# Patient Record
Sex: FEMALE | Race: WHITE | Employment: OTHER | URBAN - METROPOLITAN AREA
[De-identification: names, ages, dates, MRNs, and addresses within clinical notes are randomized per-mention and may not be internally consistent; named-entity substitution may affect disease eponyms.]

---

## 2017-06-02 ENCOUNTER — OFFICE VISIT (OUTPATIENT)
Dept: CARDIOLOGY CLINIC | Age: 82
End: 2017-06-02

## 2017-06-02 VITALS
HEIGHT: 62 IN | SYSTOLIC BLOOD PRESSURE: 134 MMHG | OXYGEN SATURATION: 94 % | DIASTOLIC BLOOD PRESSURE: 62 MMHG | WEIGHT: 210.8 LBS | BODY MASS INDEX: 38.79 KG/M2 | HEART RATE: 48 BPM

## 2017-06-02 DIAGNOSIS — I25.83 CORONARY ARTERY DISEASE DUE TO LIPID RICH PLAQUE: Primary | ICD-10-CM

## 2017-06-02 DIAGNOSIS — I10 ESSENTIAL HYPERTENSION: ICD-10-CM

## 2017-06-02 DIAGNOSIS — I25.10 CORONARY ARTERY DISEASE DUE TO LIPID RICH PLAQUE: Primary | ICD-10-CM

## 2017-06-02 DIAGNOSIS — I87.2 VENOUS INSUFFICIENCY: ICD-10-CM

## 2017-06-02 DIAGNOSIS — E78.00 PURE HYPERCHOLESTEROLEMIA: ICD-10-CM

## 2017-06-02 DIAGNOSIS — Z95.1 S/P CABG (CORONARY ARTERY BYPASS GRAFT): ICD-10-CM

## 2017-06-02 PROCEDURE — G8417 CALC BMI ABV UP PARAM F/U: HCPCS | Performed by: INTERNAL MEDICINE

## 2017-06-02 PROCEDURE — 1090F PRES/ABSN URINE INCON ASSESS: CPT | Performed by: INTERNAL MEDICINE

## 2017-06-02 PROCEDURE — 1123F ACP DISCUSS/DSCN MKR DOCD: CPT | Performed by: INTERNAL MEDICINE

## 2017-06-02 PROCEDURE — G8598 ASA/ANTIPLAT THER USED: HCPCS | Performed by: INTERNAL MEDICINE

## 2017-06-02 PROCEDURE — 4040F PNEUMOC VAC/ADMIN/RCVD: CPT | Performed by: INTERNAL MEDICINE

## 2017-06-02 PROCEDURE — 1036F TOBACCO NON-USER: CPT | Performed by: INTERNAL MEDICINE

## 2017-06-02 PROCEDURE — G8400 PT W/DXA NO RESULTS DOC: HCPCS | Performed by: INTERNAL MEDICINE

## 2017-06-02 PROCEDURE — G8427 DOCREV CUR MEDS BY ELIG CLIN: HCPCS | Performed by: INTERNAL MEDICINE

## 2017-06-02 PROCEDURE — 99214 OFFICE O/P EST MOD 30 MIN: CPT | Performed by: INTERNAL MEDICINE

## 2020-03-27 ENCOUNTER — APPOINTMENT (OUTPATIENT)
Dept: GENERAL RADIOLOGY | Age: 85
DRG: 872 | End: 2020-03-27
Payer: MEDICARE

## 2020-03-27 ENCOUNTER — HOSPITAL ENCOUNTER (INPATIENT)
Age: 85
LOS: 2 days | Discharge: HOME | DRG: 872 | End: 2020-03-29
Attending: EMERGENCY MEDICINE | Admitting: FAMILY MEDICINE
Payer: MEDICARE

## 2020-03-27 PROBLEM — N39.0 UTI (URINARY TRACT INFECTION): Status: ACTIVE | Noted: 2020-03-27

## 2020-03-27 LAB
ANION GAP SERPL CALCULATED.3IONS-SCNC: 14 MMOL/L (ref 3–16)
BACTERIA: ABNORMAL /HPF
BASOPHILS ABSOLUTE: 0 K/UL (ref 0–0.2)
BASOPHILS RELATIVE PERCENT: 0.3 %
BILIRUBIN URINE: NEGATIVE
BLOOD, URINE: ABNORMAL
BUN BLDV-MCNC: 20 MG/DL (ref 7–20)
CALCIUM SERPL-MCNC: 9.3 MG/DL (ref 8.3–10.6)
CHLORIDE BLD-SCNC: 98 MMOL/L (ref 99–110)
CLARITY: ABNORMAL
CO2: 23 MMOL/L (ref 21–32)
COLOR: YELLOW
CREAT SERPL-MCNC: 1.3 MG/DL (ref 0.6–1.2)
EOSINOPHILS ABSOLUTE: 0 K/UL (ref 0–0.6)
EOSINOPHILS RELATIVE PERCENT: 0.1 %
EPITHELIAL CELLS, UA: 4 /HPF (ref 0–5)
GFR AFRICAN AMERICAN: 47
GFR NON-AFRICAN AMERICAN: 39
GLUCOSE BLD-MCNC: 135 MG/DL (ref 70–99)
GLUCOSE BLD-MCNC: 173 MG/DL (ref 70–99)
GLUCOSE BLD-MCNC: 259 MG/DL (ref 70–99)
GLUCOSE URINE: 100 MG/DL
HCT VFR BLD CALC: 32.2 % (ref 36–48)
HEMOGLOBIN: 10.4 G/DL (ref 12–16)
HYALINE CASTS: 3 /LPF (ref 0–8)
INR BLD: 1.07 (ref 0.86–1.14)
KETONES, URINE: 15 MG/DL
LACTIC ACID, SEPSIS: 3.3 MMOL/L (ref 0.4–1.9)
LACTIC ACID, SEPSIS: 4.2 MMOL/L (ref 0.4–1.9)
LEUKOCYTE ESTERASE, URINE: ABNORMAL
LYMPHOCYTES ABSOLUTE: 0.5 K/UL (ref 1–5.1)
LYMPHOCYTES RELATIVE PERCENT: 5.7 %
MCH RBC QN AUTO: 28.7 PG (ref 26–34)
MCHC RBC AUTO-ENTMCNC: 32.4 G/DL (ref 31–36)
MCV RBC AUTO: 88.6 FL (ref 80–100)
MICROSCOPIC EXAMINATION: YES
MONOCYTES ABSOLUTE: 0.6 K/UL (ref 0–1.3)
MONOCYTES RELATIVE PERCENT: 6.6 %
NEUTROPHILS ABSOLUTE: 7.9 K/UL (ref 1.7–7.7)
NEUTROPHILS RELATIVE PERCENT: 87.3 %
NITRITE, URINE: NEGATIVE
PDW BLD-RTO: 15.9 % (ref 12.4–15.4)
PERFORMED ON: ABNORMAL
PERFORMED ON: ABNORMAL
PH UA: 5.5 (ref 5–8)
PLATELET # BLD: 187 K/UL (ref 135–450)
PMV BLD AUTO: 8.6 FL (ref 5–10.5)
POTASSIUM SERPL-SCNC: 3.9 MMOL/L (ref 3.5–5.1)
PROTEIN UA: 100 MG/DL
PROTHROMBIN TIME: 12.4 SEC (ref 10–13.2)
RBC # BLD: 3.63 M/UL (ref 4–5.2)
RBC UA: 10 /HPF (ref 0–4)
SODIUM BLD-SCNC: 135 MMOL/L (ref 136–145)
SPECIFIC GRAVITY UA: 1.02 (ref 1–1.03)
URINE REFLEX TO CULTURE: YES
URINE TYPE: ABNORMAL
UROBILINOGEN, URINE: 1 E.U./DL
WBC # BLD: 9 K/UL (ref 4–11)
WBC UA: 205 /HPF (ref 0–5)

## 2020-03-27 PROCEDURE — 80048 BASIC METABOLIC PNL TOTAL CA: CPT

## 2020-03-27 PROCEDURE — 99285 EMERGENCY DEPT VISIT HI MDM: CPT

## 2020-03-27 PROCEDURE — 2060000000 HC ICU INTERMEDIATE R&B

## 2020-03-27 PROCEDURE — 36415 COLL VENOUS BLD VENIPUNCTURE: CPT

## 2020-03-27 PROCEDURE — 93005 ELECTROCARDIOGRAM TRACING: CPT | Performed by: EMERGENCY MEDICINE

## 2020-03-27 PROCEDURE — 87186 SC STD MICRODIL/AGAR DIL: CPT

## 2020-03-27 PROCEDURE — 2580000003 HC RX 258: Performed by: NURSE PRACTITIONER

## 2020-03-27 PROCEDURE — 81001 URINALYSIS AUTO W/SCOPE: CPT

## 2020-03-27 PROCEDURE — 6370000000 HC RX 637 (ALT 250 FOR IP): Performed by: FAMILY MEDICINE

## 2020-03-27 PROCEDURE — 87086 URINE CULTURE/COLONY COUNT: CPT

## 2020-03-27 PROCEDURE — 83036 HEMOGLOBIN GLYCOSYLATED A1C: CPT

## 2020-03-27 PROCEDURE — 2580000003 HC RX 258: Performed by: FAMILY MEDICINE

## 2020-03-27 PROCEDURE — 87077 CULTURE AEROBIC IDENTIFY: CPT

## 2020-03-27 PROCEDURE — 96365 THER/PROPH/DIAG IV INF INIT: CPT

## 2020-03-27 PROCEDURE — 85610 PROTHROMBIN TIME: CPT

## 2020-03-27 PROCEDURE — 83605 ASSAY OF LACTIC ACID: CPT

## 2020-03-27 PROCEDURE — 6360000002 HC RX W HCPCS: Performed by: NURSE PRACTITIONER

## 2020-03-27 PROCEDURE — 6370000000 HC RX 637 (ALT 250 FOR IP): Performed by: NURSE PRACTITIONER

## 2020-03-27 PROCEDURE — 71045 X-RAY EXAM CHEST 1 VIEW: CPT

## 2020-03-27 PROCEDURE — 85025 COMPLETE CBC W/AUTO DIFF WBC: CPT

## 2020-03-27 RX ORDER — DEXTROSE MONOHYDRATE 50 MG/ML
100 INJECTION, SOLUTION INTRAVENOUS PRN
Status: DISCONTINUED | OUTPATIENT
Start: 2020-03-27 | End: 2020-03-29 | Stop reason: HOSPADM

## 2020-03-27 RX ORDER — PROMETHAZINE HYDROCHLORIDE 25 MG/1
12.5 TABLET ORAL EVERY 6 HOURS PRN
Status: DISCONTINUED | OUTPATIENT
Start: 2020-03-27 | End: 2020-03-29 | Stop reason: HOSPADM

## 2020-03-27 RX ORDER — POLYETHYLENE GLYCOL 3350 17 G/17G
17 POWDER, FOR SOLUTION ORAL DAILY PRN
Status: DISCONTINUED | OUTPATIENT
Start: 2020-03-27 | End: 2020-03-29 | Stop reason: HOSPADM

## 2020-03-27 RX ORDER — ONDANSETRON 2 MG/ML
4 INJECTION INTRAMUSCULAR; INTRAVENOUS EVERY 6 HOURS PRN
Status: DISCONTINUED | OUTPATIENT
Start: 2020-03-27 | End: 2020-03-29 | Stop reason: HOSPADM

## 2020-03-27 RX ORDER — ROSUVASTATIN CALCIUM 10 MG/1
10 TABLET, COATED ORAL DAILY
Status: DISCONTINUED | OUTPATIENT
Start: 2020-03-28 | End: 2020-03-29 | Stop reason: HOSPADM

## 2020-03-27 RX ORDER — ACETAMINOPHEN 500 MG
1000 TABLET ORAL ONCE
Status: COMPLETED | OUTPATIENT
Start: 2020-03-27 | End: 2020-03-27

## 2020-03-27 RX ORDER — SODIUM CHLORIDE 9 MG/ML
INJECTION, SOLUTION INTRAVENOUS CONTINUOUS
Status: DISCONTINUED | OUTPATIENT
Start: 2020-03-27 | End: 2020-03-28

## 2020-03-27 RX ORDER — ACETAMINOPHEN 650 MG/1
650 SUPPOSITORY RECTAL EVERY 6 HOURS PRN
Status: DISCONTINUED | OUTPATIENT
Start: 2020-03-27 | End: 2020-03-29 | Stop reason: HOSPADM

## 2020-03-27 RX ORDER — NATEGLINIDE 120 MG/1
120 TABLET ORAL
COMMUNITY

## 2020-03-27 RX ORDER — INSULIN LISPRO 100 [IU]/ML
0-12 INJECTION, SOLUTION INTRAVENOUS; SUBCUTANEOUS
Status: DISCONTINUED | OUTPATIENT
Start: 2020-03-27 | End: 2020-03-29 | Stop reason: HOSPADM

## 2020-03-27 RX ORDER — NICOTINE POLACRILEX 4 MG
15 LOZENGE BUCCAL PRN
Status: DISCONTINUED | OUTPATIENT
Start: 2020-03-27 | End: 2020-03-29 | Stop reason: HOSPADM

## 2020-03-27 RX ORDER — INSULIN LISPRO 100 [IU]/ML
0-6 INJECTION, SOLUTION INTRAVENOUS; SUBCUTANEOUS NIGHTLY
Status: DISCONTINUED | OUTPATIENT
Start: 2020-03-27 | End: 2020-03-29 | Stop reason: HOSPADM

## 2020-03-27 RX ORDER — 0.9 % SODIUM CHLORIDE 0.9 %
1000 INTRAVENOUS SOLUTION INTRAVENOUS ONCE
Status: COMPLETED | OUTPATIENT
Start: 2020-03-27 | End: 2020-03-27

## 2020-03-27 RX ORDER — NITROGLYCERIN 0.4 MG/1
0.4 TABLET SUBLINGUAL EVERY 5 MIN PRN
Status: DISCONTINUED | OUTPATIENT
Start: 2020-03-27 | End: 2020-03-29 | Stop reason: HOSPADM

## 2020-03-27 RX ORDER — ASPIRIN 81 MG/1
81 TABLET, CHEWABLE ORAL DAILY
Status: DISCONTINUED | OUTPATIENT
Start: 2020-03-28 | End: 2020-03-29 | Stop reason: HOSPADM

## 2020-03-27 RX ORDER — DEXTROSE MONOHYDRATE 25 G/50ML
12.5 INJECTION, SOLUTION INTRAVENOUS PRN
Status: DISCONTINUED | OUTPATIENT
Start: 2020-03-27 | End: 2020-03-29 | Stop reason: HOSPADM

## 2020-03-27 RX ORDER — SODIUM CHLORIDE 0.9 % (FLUSH) 0.9 %
10 SYRINGE (ML) INJECTION PRN
Status: DISCONTINUED | OUTPATIENT
Start: 2020-03-27 | End: 2020-03-29 | Stop reason: HOSPADM

## 2020-03-27 RX ORDER — ACETAMINOPHEN 325 MG/1
650 TABLET ORAL EVERY 6 HOURS PRN
Status: DISCONTINUED | OUTPATIENT
Start: 2020-03-27 | End: 2020-03-29 | Stop reason: HOSPADM

## 2020-03-27 RX ORDER — SODIUM CHLORIDE 0.9 % (FLUSH) 0.9 %
10 SYRINGE (ML) INJECTION EVERY 12 HOURS SCHEDULED
Status: DISCONTINUED | OUTPATIENT
Start: 2020-03-27 | End: 2020-03-29 | Stop reason: HOSPADM

## 2020-03-27 RX ORDER — CLOPIDOGREL BISULFATE 75 MG/1
75 TABLET ORAL DAILY
Status: DISCONTINUED | OUTPATIENT
Start: 2020-03-28 | End: 2020-03-29 | Stop reason: HOSPADM

## 2020-03-27 RX ADMIN — INSULIN LISPRO 2 UNITS: 100 INJECTION, SOLUTION INTRAVENOUS; SUBCUTANEOUS at 18:19

## 2020-03-27 RX ADMIN — METOPROLOL TARTRATE 25 MG: 25 TABLET, FILM COATED ORAL at 21:57

## 2020-03-27 RX ADMIN — Medication 10 ML: at 21:58

## 2020-03-27 RX ADMIN — SODIUM CHLORIDE: 9 INJECTION, SOLUTION INTRAVENOUS at 17:52

## 2020-03-27 RX ADMIN — SODIUM CHLORIDE 1000 ML: 9 INJECTION, SOLUTION INTRAVENOUS at 13:16

## 2020-03-27 RX ADMIN — Medication 1 G: at 14:13

## 2020-03-27 RX ADMIN — ACETAMINOPHEN 1000 MG: 500 TABLET, FILM COATED ORAL at 13:16

## 2020-03-27 ASSESSMENT — PAIN SCALES - WONG BAKER
WONGBAKER_NUMERICALRESPONSE: 0

## 2020-03-27 ASSESSMENT — ENCOUNTER SYMPTOMS
VOMITING: 0
DIARRHEA: 0
ABDOMINAL PAIN: 0
SHORTNESS OF BREATH: 0
CHEST TIGHTNESS: 0
NAUSEA: 0

## 2020-03-27 ASSESSMENT — PAIN SCALES - GENERAL
PAINLEVEL_OUTOF10: 0
PAINLEVEL_OUTOF10: 0

## 2020-03-27 NOTE — ED PROVIDER NOTES
tablet 0     PHYSICAL EXAM  Vitals: /67   Pulse 99   Temp 101.1 °F (38.4 °C) (Rectal)   Resp 18   SpO2 93%   Constitutional:  80 y.o. female alert, cooperative  HENT:  Atraumatic scalp, mucous membranes moist  Eyes:   Conjunctiva clear, no icterus  Neck:  Supple, no visible JVD, no signs of injury  Cardiovascular:  Regular, no rubs  Thorax & Lungs:  No accessory muscle usage, clear  Abdomen:  Soft, non distended, NT  Back:  No deformity  Genitalia:  Deferred  Rectal:  Deferred  Extremities:  No cyanosis, no edema, adequate perfusion  Skin:  Warm, dry  Neurologic:  Alert, no slurred speech  Psychiatric:  Affect appropriate    Medical Decision Making and Plan:  Briefly, this is an 80 y. o.female who presented with fatigue, fever. Found to have UTI, sepsis. IVF, abx. Plan is to admit for further care. For further details of [facility] Emergency Department encounter, please see documentation by advanced practice provider Bob Henry CNP.     Results for orders placed or performed during the hospital encounter of 34/19/47   Basic Metabolic Panel   Result Value Ref Range    Sodium 135 (L) 136 - 145 mmol/L    Potassium 3.9 3.5 - 5.1 mmol/L    Chloride 98 (L) 99 - 110 mmol/L    CO2 23 21 - 32 mmol/L    Anion Gap 14 3 - 16    Glucose 259 (H) 70 - 99 mg/dL    BUN 20 7 - 20 mg/dL    CREATININE 1.3 (H) 0.6 - 1.2 mg/dL    GFR Non-African American 39 (A) >60    GFR  47 (A) >60    Calcium 9.3 8.3 - 10.6 mg/dL   CBC Auto Differential   Result Value Ref Range    WBC 9.0 4.0 - 11.0 K/uL    RBC 3.63 (L) 4.00 - 5.20 M/uL    Hemoglobin 10.4 (L) 12.0 - 16.0 g/dL    Hematocrit 32.2 (L) 36.0 - 48.0 %    MCV 88.6 80.0 - 100.0 fL    MCH 28.7 26.0 - 34.0 pg    MCHC 32.4 31.0 - 36.0 g/dL    RDW 15.9 (H) 12.4 - 15.4 %    Platelets 803 823 - 228 K/uL    MPV 8.6 5.0 - 10.5 fL    Neutrophils % 87.3 %    Lymphocytes % 5.7 %    Monocytes % 6.6 %    Eosinophils % 0.1 %    Basophils % 0.3 %    Neutrophils

## 2020-03-27 NOTE — ED NOTES
Report given to Channing Zuñiga RN. Patient alert and oriented. Patient transferred to floor by Channing Zuñiga RN via wheelchair. Skin appropriate for ethnicity, dry and intact. No signs of distress. Regular respiratory pattern, normal respiratory depth, unlabored respirations.          Francis Duque RN  03/27/20 8314

## 2020-03-27 NOTE — ED PROVIDER NOTES
[address] [facility]        Pt Name: Odin Wade  MRN: [id number]  [place] 1935  Date of evaluation: 3/27/2020  Provider: SCAR Washington - CNP  PCP: Manuel Lee     I have seen and evaluated this patient with my supervising physician No att. providers found. CHIEF COMPLAINT       Chief Complaint   Patient presents with    Fever     pt states she \"just doesn't feel well\". pt states she hasn't been sleeping very well and granddaughter states she has had fevers. pt denies n/v/d. HISTORY OF PRESENT ILLNESS   (Location, Timing/Onset, Context/Setting, Quality, Duration, Modifying Factors, Severity, Associated Signs and Symptoms)  Note limiting factors. Odin Wade is a 80 y.o. female presents to the emergency department complaining of \"just not feeling well\". The patient reports that she has not felt well since yesterday, states that she was unable to sleep overnight had chills as well as body aches. Thought maybe that she had a fever. She reports decreased appetite. She denies any cough or congestion, no chest pain. Denies any lightheadedness, dizziness, visual disturbances. No chest pain or pressure. No neck or back pain. No shortness of breath, cough, or congestion. No abdominal pain, nausea, vomiting, diarrhea, constipation, or dysuria. No rash. Nursing Notes were all reviewed and agreed with or any disagreements were addressed in the HPI. REVIEW OF SYSTEMS    (2-9 systems for level 4, 10 or more for level 5)     Review of Systems   Constitutional: Positive for activity change, appetite change, chills, fatigue and fever. Respiratory: Negative for chest tightness and shortness of breath. Cardiovascular: Negative for chest pain. Gastrointestinal: Negative for abdominal pain, diarrhea, nausea and vomiting. Genitourinary: Negative for dysuria. Musculoskeletal: Positive for arthralgias and myalgias. Psychiatric/Behavioral: Positive for sleep disturbance. All other systems reviewed and are negative. Positives and Pertinent negatives as per HPI. Except as noted above in the ROS, all other systems were reviewed and negative. PAST MEDICAL HISTORY     Past Medical History:   Diagnosis Date    Acute MI ([facility] [address]     Arthritis     hands    CAD (coronary artery disease)     stents    Diabetes mellitus ([facility] [address]     GERD (gastroesophageal reflux disease)     History of blood transfusion     R/T previous surgeries    Hyperlipidemia     Hypertension     Nausea & vomiting          SURGICAL HISTORY     Past Surgical History:   Procedure Laterality Date    BACK SURGERY      BREAST SURGERY      CARDIAC SURGERY      STENTS X3    CHOLECYSTECTOMY, LAPAROSCOPIC      EYE SURGERY      KNEE SURGERY      right         CURRENTMEDICATIONS       Current Discharge Medication List      CONTINUE these medications which have NOT CHANGED    Details   diclofenac sodium (VOLTAREN) 1 % GEL Apply 2 g topically 4 times daily as needed for Pain      nateglinide (STARLIX) 120 MG tablet Take 120 mg by mouth 3 times daily (before meals)      sitaGLIPtan-metformin (JANUMET)  MG per tablet Take 1 tablet by mouth 2 times daily (with meals)       metoprolol (LOPRESSOR) 25 MG tablet Take 25 mg by mouth 2 times daily       rosuvastatin (CRESTOR) 10 MG tablet Take 10 mg by mouth daily. clopidogrel (PLAVIX) 75 MG tablet Take 75 mg by mouth daily. lansoprazole (PREVACID) 30 MG capsule Take 30 mg by mouth daily. aspirin 81 MG chewable tablet Take 81 mg by mouth daily. nitroGLYCERIN (NITROSTAT) 0.4 MG SL tablet Place 1 tablet under the tongue every 5 minutes as needed for Chest pain. Qty: 25 tablet, Refills: 0               ALLERGIES     Azithromycin; Canagliflozin; Codeine; Cortisone;  Hydrocodone-acetaminophen; Lisinopril; Tramadol; and Celecoxib    FAMILYHISTORY       Family History   [facility]) injection 4 mg (has no administration in time range)   enoxaparin (LOVENOX) injection 40 mg (has no administration in time range)   glucose (GLUTOSE) 40 % oral gel 15 g (has no administration in time range)   dextrose 50 % IV solution (has no administration in time range)   glucagon (rDNA) injection 1 mg (has no administration in time range)   dextrose 5 % solution (has no administration in time range)   insulin lispro (1 Unit Dial) 0-12 Units (has no administration in time range)   insulin lispro (1 Unit Dial) 0-6 Units (has no administration in time range)   acetaminophen (TYLENOL) tablet 1,000 mg (1,000 mg Oral Given 3/27/20 1316)   0.9 % sodium chloride bolus (0 mLs Intravenous Stopped 3/27/20 1416)   cefTRIAXone (ROCEPHIN) 1 g in sterile water 10 mL IV syringe (0 g Intravenous Stop Time 3/27/20 1423)       Briefly, this is a 80year old female that  presents to the emergency department complaining of \"just not feeling well\". The patient reports that she has not felt well since yesterday, states that she was unable to sleep overnight had chills as well as body aches. Thought maybe that she had a fever. She reports decreased appetite. She denies any cough or congestion, no chest pain. Her granddaughter is a nurse and she wanted her grandmother checked for COVID. Fever 101.1 rectally, treated with tylenol. On IV fluids. PAOLA present with a creatinine of 1.3 and GFR of 39, this is out of character. CBC is unremarkable. Lactate 3.3. INR 1.07. XR CHEST PORTABLE (Final result)   Result time 03/27/20 13:02:47   Final result by Joanne Rosa MD (03/27/20 13:02:47)                Impression:    1. No acute cardiopulmonary process identified. UA concerning for infection with 4+ bacteria and 205 WBC. She will be admitted for PAOLA and sepsis. hospitalist will admit. Family was updated via telephone by me. FINAL IMPRESSION      1. Septicemia ([address]    2.  PAOLA (acute kidney

## 2020-03-27 NOTE — PLAN OF CARE
Problem: Falls - Risk of:  Goal: Will remain free from falls  Description: Will remain free from falls  Outcome: Ongoing  Note: Call light in reach; bed in low position; SR up x2; pt affirms awareness and understanding of need to call for and await assist with OOB mobility if lightheaded or fatigued. Problem: Urinary Elimination:  Goal: Signs and symptoms of infection will decrease  Description: Signs and symptoms of infection will decrease  Outcome: Ongoing  Note: Denies pain or burning with urination at this time     Problem: Urinary Elimination:  Goal: Ability to reestablish a normal urinary elimination pattern will improve - after catheter removal  Description: Ability to reestablish a normal urinary elimination pattern will improve  Outcome: Completed     Problem: KNOWLEDGE DEFICIT  Goal: Patient/S.O. demonstrates understanding of disease process, treatment plan, medications, and discharge instructions.   Outcome: Ongoing  Note: Instructed on current plan of care     Problem: DISCHARGE BARRIERS  Goal: Patient's continuum of care needs are met  Outcome: Ongoing  Note: Anticipate discharge to home with local family support

## 2020-03-28 LAB
ANION GAP SERPL CALCULATED.3IONS-SCNC: 13 MMOL/L (ref 3–16)
BASOPHILS ABSOLUTE: 0 K/UL (ref 0–0.2)
BASOPHILS RELATIVE PERCENT: 0.3 %
BUN BLDV-MCNC: 15 MG/DL (ref 7–20)
CALCIUM SERPL-MCNC: 8.6 MG/DL (ref 8.3–10.6)
CHLORIDE BLD-SCNC: 106 MMOL/L (ref 99–110)
CO2: 22 MMOL/L (ref 21–32)
CREAT SERPL-MCNC: 1 MG/DL (ref 0.6–1.2)
EKG ATRIAL RATE: 97 BPM
EKG DIAGNOSIS: NORMAL
EKG P AXIS: -1 DEGREES
EKG P-R INTERVAL: 136 MS
EKG Q-T INTERVAL: 374 MS
EKG QRS DURATION: 134 MS
EKG QTC CALCULATION (BAZETT): 474 MS
EKG R AXIS: 10 DEGREES
EKG T AXIS: 40 DEGREES
EKG VENTRICULAR RATE: 97 BPM
EOSINOPHILS ABSOLUTE: 0.2 K/UL (ref 0–0.6)
EOSINOPHILS RELATIVE PERCENT: 2.4 %
ESTIMATED AVERAGE GLUCOSE: 162.8 MG/DL
GFR AFRICAN AMERICAN: >60
GFR NON-AFRICAN AMERICAN: 53
GLUCOSE BLD-MCNC: 118 MG/DL (ref 70–99)
GLUCOSE BLD-MCNC: 131 MG/DL (ref 70–99)
GLUCOSE BLD-MCNC: 154 MG/DL (ref 70–99)
GLUCOSE BLD-MCNC: 172 MG/DL (ref 70–99)
GLUCOSE BLD-MCNC: 183 MG/DL (ref 70–99)
HBA1C MFR BLD: 7.3 %
HCT VFR BLD CALC: 29.7 % (ref 36–48)
HEMOGLOBIN: 9.5 G/DL (ref 12–16)
LACTIC ACID: 1.2 MMOL/L (ref 0.4–2)
LYMPHOCYTES ABSOLUTE: 1.7 K/UL (ref 1–5.1)
LYMPHOCYTES RELATIVE PERCENT: 18.9 %
MCH RBC QN AUTO: 27.8 PG (ref 26–34)
MCHC RBC AUTO-ENTMCNC: 31.9 G/DL (ref 31–36)
MCV RBC AUTO: 87.3 FL (ref 80–100)
MONOCYTES ABSOLUTE: 1.1 K/UL (ref 0–1.3)
MONOCYTES RELATIVE PERCENT: 12 %
NEUTROPHILS ABSOLUTE: 6.1 K/UL (ref 1.7–7.7)
NEUTROPHILS RELATIVE PERCENT: 66.4 %
PDW BLD-RTO: 15.9 % (ref 12.4–15.4)
PERFORMED ON: ABNORMAL
PLATELET # BLD: 185 K/UL (ref 135–450)
PMV BLD AUTO: 7.9 FL (ref 5–10.5)
POTASSIUM SERPL-SCNC: 4 MMOL/L (ref 3.5–5.1)
RBC # BLD: 3.4 M/UL (ref 4–5.2)
SODIUM BLD-SCNC: 141 MMOL/L (ref 136–145)
WBC # BLD: 9.2 K/UL (ref 4–11)

## 2020-03-28 PROCEDURE — 80048 BASIC METABOLIC PNL TOTAL CA: CPT

## 2020-03-28 PROCEDURE — 83605 ASSAY OF LACTIC ACID: CPT

## 2020-03-28 PROCEDURE — 2580000003 HC RX 258: Performed by: FAMILY MEDICINE

## 2020-03-28 PROCEDURE — 85025 COMPLETE CBC W/AUTO DIFF WBC: CPT

## 2020-03-28 PROCEDURE — 6360000002 HC RX W HCPCS: Performed by: FAMILY MEDICINE

## 2020-03-28 PROCEDURE — 2060000000 HC ICU INTERMEDIATE R&B

## 2020-03-28 PROCEDURE — 36415 COLL VENOUS BLD VENIPUNCTURE: CPT

## 2020-03-28 PROCEDURE — 6370000000 HC RX 637 (ALT 250 FOR IP): Performed by: FAMILY MEDICINE

## 2020-03-28 PROCEDURE — 93010 ELECTROCARDIOGRAM REPORT: CPT | Performed by: INTERNAL MEDICINE

## 2020-03-28 RX ADMIN — ROSUVASTATIN CALCIUM 10 MG: 10 TABLET, FILM COATED ORAL at 20:32

## 2020-03-28 RX ADMIN — Medication 1 G: at 14:52

## 2020-03-28 RX ADMIN — METOPROLOL TARTRATE 25 MG: 25 TABLET, FILM COATED ORAL at 10:03

## 2020-03-28 RX ADMIN — INSULIN LISPRO 2 UNITS: 100 INJECTION, SOLUTION INTRAVENOUS; SUBCUTANEOUS at 12:37

## 2020-03-28 RX ADMIN — CLOPIDOGREL 75 MG: 75 TABLET, FILM COATED ORAL at 10:03

## 2020-03-28 RX ADMIN — SODIUM CHLORIDE: 9 INJECTION, SOLUTION INTRAVENOUS at 13:07

## 2020-03-28 RX ADMIN — INSULIN LISPRO 1 UNITS: 100 INJECTION, SOLUTION INTRAVENOUS; SUBCUTANEOUS at 21:00

## 2020-03-28 RX ADMIN — Medication 10 ML: at 20:33

## 2020-03-28 RX ADMIN — Medication 10 ML: at 14:53

## 2020-03-28 RX ADMIN — ASPIRIN 81 MG 81 MG: 81 TABLET ORAL at 10:03

## 2020-03-28 RX ADMIN — ENOXAPARIN SODIUM 40 MG: 40 INJECTION SUBCUTANEOUS at 10:02

## 2020-03-28 RX ADMIN — METOPROLOL TARTRATE 25 MG: 25 TABLET, FILM COATED ORAL at 20:32

## 2020-03-28 ASSESSMENT — PAIN SCALES - GENERAL
PAINLEVEL_OUTOF10: 0

## 2020-03-28 ASSESSMENT — PAIN SCALES - WONG BAKER
WONGBAKER_NUMERICALRESPONSE: 0

## 2020-03-28 NOTE — PLAN OF CARE
Re-Assessment complete, see flow sheet. Blood sugar 172, given correctional insulin per orders. Denies pain, no complaints voiced. Hoping to be discharged tomorrow. Will continue to monitor. Marilee Mccormack RN, BSN, PCCN.

## 2020-03-28 NOTE — PROGRESS NOTES
[facility]    3/28/2020 2:39 PM        Name: Cristina Bui . Admitted: 3/27/2020  Primary Care Provider: Brad Melendez (Tel: [phone])      Subjective:  . Patient seen this am. Feeling better today. No abdominal pain. Denied urinary symptoms. No cough or sob. Had fevers in ED and at home.      Reviewed interval ancillary notes    Current Medications  aspirin chewable tablet 81 mg, Daily  clopidogrel (PLAVIX) tablet 75 mg, Daily  metoprolol tartrate (LOPRESSOR) tablet 25 mg, BID  nitroGLYCERIN (NITROSTAT) SL tablet 0.4 mg, Q5 Min PRN  rosuvastatin (CRESTOR) tablet 10 mg, Daily  0.9 % sodium chloride infusion, Continuous  cefTRIAXone (ROCEPHIN) 1 g in sterile water 10 mL IV syringe, Q24H  sodium chloride flush 0.9 % injection 10 mL, 2 times per day  sodium chloride flush 0.9 % injection 10 mL, PRN  acetaminophen (TYLENOL) tablet 650 mg, Q6H PRN    Or  acetaminophen (TYLENOL) suppository 650 mg, Q6H PRN  polyethylene glycol (GLYCOLAX) packet 17 g, Daily PRN  promethazine (PHENERGAN) tablet 12.5 mg, Q6H PRN    Or  ondansetron (ZOFRAN) injection 4 mg, Q6H PRN  enoxaparin (LOVENOX) injection 40 mg, Daily  glucose (GLUTOSE) 40 % oral gel 15 g, PRN  dextrose 50 % IV solution, PRN  glucagon (rDNA) injection 1 mg, PRN  dextrose 5 % solution, PRN  insulin lispro (1 Unit Dial) 0-12 Units, TID WC  insulin lispro (1 Unit Dial) 0-6 Units, Nightly        Objective:  /63   Pulse 58   Temp 97.8 °F (36.6 °C) (Temporal)   Resp 18   Ht 5' 2\" (1.575 m)   Wt 211 lb (95.7 kg)   SpO2 98%   BMI 38.59 kg/m²     Intake/Output Summary (Last 24 hours) at 3/28/2020 1439  Last data filed at 3/28/2020 1307  Gross per 24 hour   Intake 2440 ml   Output 1875 ml   Net 565 ml      Wt Readings from Last 3 Encounters:   03/28/20 211 lb (95.7 kg)   06/02/17 210 lb 12.8 oz (95.6 kg)   12/14/16 205 lb 8 oz (93.2 kg)       General appearance:  Appears comfortable  Eyes: Sclera clear. Pupils equal.  ENT: Moist oral mucosa. Trachea midline, no adenopathy. Cardiovascular: Regular rhythm, normal S1, S2. No murmur. No edema in lower extremities  Respiratory: Not using accessory muscles. Good inspiratory effort. Clear to auscultation bilaterally, no wheeze or crackles. GI: Abdomen soft, no tenderness, not distended, normal bowel sounds  Musculoskeletal: No cyanosis in digits, neck supple  Neurology: CN 2-12 grossly intact. No speech or motor deficits  Psych: Normal affect. Alert and oriented in time, place and person  Skin: Warm, dry, normal turgor    Labs and Tests:  CBC:   Recent Labs     03/27/20  1213 03/28/20  0931   WBC 9.0 9.2   HGB 10.4* 9.5*    185     BMP:    Recent Labs     03/27/20  1213 03/28/20  0931   * 141   K 3.9 4.0   CL 98* 106   CO2 23 22   BUN 20 15   CREATININE 1.3* 1.0   GLUCOSE 259* 183*     Hepatic: No results for input(s): AST, ALT, ALB, BILITOT, ALKPHOS in the last 72 hours. Problem List  Active Problems:    UTI (urinary tract infection)  Resolved Problems:    * No resolved hospital problems. *       Assessment & Plan:   1. Sepsis-secondary to UTI. Resolved. Fever, tachycardia, lactic acidosis all resolved. 2. UTI-continue rocephin pending cultures. Dc home if cultures and sensitivities back. 3. Dm 2-acceptable control with an a1c of 7.3 in this elderly female.        Diet: DIET CARB CONTROL;  Code:Full Code  DVT PPX; lovenox      Reji Coyne PA-C   3/28/2020 2:39 PM

## 2020-03-28 NOTE — H&P
% injection 10 mL  10 mL Intravenous PRN Brandon Perez MD        acetaminophen (TYLENOL) tablet 650 mg  650 mg Oral Q6H PRN Brandon Perez MD        Or   [facility] acetaminophen (TYLENOL) suppository 650 mg  650 mg Rectal Q6H PRN Brandon Perez MD        polyethylene glycol (GLYCOLAX) packet 17 g  17 g Oral Daily PRN Brandon Perez MD        promethazine (PHENERGAN) tablet 12.5 mg  12.5 mg Oral Q6H PRN Brandon Perez MD        Or    ondansetron (ZOFRAN) injection 4 mg  4 mg Intravenous Q6H PRN Brandon Perez MD       [facility] Claude Parson ON 3/28/2020] enoxaparin (LOVENOX) injection 40 mg  40 mg Subcutaneous Daily Mirta Barnes MD        glucose (GLUTOSE) 40 % oral gel 15 g  15 g Oral PRN Brandon Perez MD        dextrose 50 % IV solution  12.5 g Intravenous PRN Brandon Perez MD        glucagon (rDNA) injection 1 mg  1 mg Intramuscular PRN Brandon Perez MD        dextrose 5 % solution  100 mL/hr Intravenous PRN Brandon Perez MD        insulin lispro (1 Unit Dial) 0-12 Units  0-12 Units Subcutaneous TID WC Brandon Perez MD   2 Units at 03/27/20 1819    insulin lispro (1 Unit Dial) 0-6 Units  0-6 Units Subcutaneous Nightly Brandon Perez MD           Allergies: Allergies   Allergen Reactions    Azithromycin     Canagliflozin      Urinary urgency    Codeine     Cortisone     Hydrocodone-Acetaminophen      Nausea + gi distress    Lisinopril      intractible cough    Tramadol      Nausea/vomiting    Celecoxib Nausea And Vomiting        Social History:   reports that she quit smoking about 27 years ago. She has a 15.00 pack-year smoking history. She has never used smokeless tobacco. She reports that she does not drink alcohol or use drugs. Family History:  family history includes Diabetes in her mother; Heart Disease in her mother; Other in her grandchild.  ,     Physical Exam:  BP (!) 110/56   Pulse 60   Temp 98.8 °F (37.1 °C) (Temporal)   Resp 17   Ht 5' 2\" (1.575 m)   Wt 211 lb 4.8 oz (95.8 kg)   SpO2 95%   BMI 38.65 kg/m²     General appearance:  Appears comfortable. Well nourished  Eyes: Sclera clear, pupils equal  ENT: Moist mucus membranes, no thrush. Trachea midline. Cardiovascular: Regular rhythm, normal S1, S2. No murmur, gallop, rub. No edema in lower extremities  Respiratory: Clear to auscultation bilaterally, no wheeze, good inspiratory effort  Gastrointestinal: Abdomen soft, non-tender, not distended, normal bowel sounds  Musculoskeletal: No cyanosis in digits, neck supple  Neurology: Cranial nerves grossly intact. Alert and oriented in time, place and person. No speech or motor deficits  Psychiatry: Appropriate affect. Not agitated  Skin: Warm, dry, normal turgor, no rash    Labs:  CBC:   Lab Results   Component Value Date    WBC 9.0 03/27/2020    RBC 3.63 03/27/2020    HGB 10.4 03/27/2020    HCT 32.2 03/27/2020    MCV 88.6 03/27/2020    MCH 28.7 03/27/2020    MCHC 32.4 03/27/2020    RDW 15.9 03/27/2020     03/27/2020    MPV 8.6 03/27/2020     BMP:    Lab Results   Component Value Date     03/27/2020    K 3.9 03/27/2020    CL 98 03/27/2020    CO2 23 03/27/2020    BUN 20 03/27/2020    CREATININE 1.3 03/27/2020    CALCIUM 9.3 03/27/2020    GFRAA 47 03/27/2020    GFRAA >60 01/22/2013    LABGLOM 39 03/27/2020    GLUCOSE 259 03/27/2020       Chest Xray:   EKG:    I visualized CXR images and EKG strips  Problem List  Active Problems:    UTI (urinary tract infection)  Resolved Problems:    * No resolved hospital problems. *        Assessment/Plan:         1. Urosepsis poa with lactic acidosis and fever  Tmax 101.1, lactic acid 4.2  Cultures pending  IV abx    Mild Kim cr 1.3  Hydrate and holding metformin    CAD cont ASA , lopressor, plavix, nitro crestor    Admit as inpatient. I anticipate hospitalization spanning more than two midnights for investigation and treatment of the above medically necessary diagnoses.       Naya Jung MD    3/27/2020 9:43 PM

## 2020-03-28 NOTE — PLAN OF CARE
Problem: Falls - Risk of:  Goal: Will remain free from falls  Description: Will remain free from falls  3/28/2020 0354 by Laura Paris RN  Outcome: Ongoing  3/27/2020 1740 by Melani Pulido RN  Outcome: Ongoing  Note: Call light in reach; bed in low position; SR up x2; pt affirms awareness and understanding of need to call for and await assist with OOB mobility if lightheaded or fatigued. Problem: Urinary Elimination:  Goal: Signs and symptoms of infection will decrease  Description: Signs and symptoms of infection will decrease  3/28/2020 0354 by Laura Paris RN  Outcome: Ongoing  3/27/2020 1740 by Melani Pulido RN  Outcome: Ongoing  Note: Denies pain or burning with urination at this time   Patient alert and oriented, respirations easy and unlabored, denies pain, afebrile,  urine frequency, no acute distress at this time  ,  all safety precautions in place, .

## 2020-03-29 VITALS
TEMPERATURE: 96.9 F | BODY MASS INDEX: 39.11 KG/M2 | OXYGEN SATURATION: 97 % | SYSTOLIC BLOOD PRESSURE: 126 MMHG | HEART RATE: 61 BPM | WEIGHT: 212.5 LBS | HEIGHT: 62 IN | DIASTOLIC BLOOD PRESSURE: 76 MMHG | RESPIRATION RATE: 18 BRPM

## 2020-03-29 LAB
GLUCOSE BLD-MCNC: 163 MG/DL (ref 70–99)
ORGANISM: ABNORMAL
ORGANISM: ABNORMAL
PERFORMED ON: ABNORMAL
URINE CULTURE, ROUTINE: ABNORMAL
URINE CULTURE, ROUTINE: ABNORMAL

## 2020-03-29 PROCEDURE — 6360000002 HC RX W HCPCS: Performed by: FAMILY MEDICINE

## 2020-03-29 PROCEDURE — 6370000000 HC RX 637 (ALT 250 FOR IP): Performed by: FAMILY MEDICINE

## 2020-03-29 RX ORDER — LEVOFLOXACIN 500 MG/1
500 TABLET, FILM COATED ORAL ONCE
Status: DISCONTINUED | OUTPATIENT
Start: 2020-03-29 | End: 2020-03-29 | Stop reason: HOSPADM

## 2020-03-29 RX ORDER — LEVOFLOXACIN 250 MG/1
250 TABLET ORAL DAILY
Qty: 4 TABLET | Refills: 0 | Status: SHIPPED | OUTPATIENT
Start: 2020-03-30 | End: 2020-04-03

## 2020-03-29 RX ADMIN — CLOPIDOGREL 75 MG: 75 TABLET, FILM COATED ORAL at 08:45

## 2020-03-29 RX ADMIN — ASPIRIN 81 MG 81 MG: 81 TABLET ORAL at 08:45

## 2020-03-29 RX ADMIN — METOPROLOL TARTRATE 25 MG: 25 TABLET, FILM COATED ORAL at 08:45

## 2020-03-29 RX ADMIN — ENOXAPARIN SODIUM 40 MG: 40 INJECTION SUBCUTANEOUS at 08:45

## 2020-03-29 RX ADMIN — INSULIN LISPRO 2 UNITS: 100 INJECTION, SOLUTION INTRAVENOUS; SUBCUTANEOUS at 08:46

## 2020-03-29 ASSESSMENT — PAIN SCALES - GENERAL
PAINLEVEL_OUTOF10: 0
PAINLEVEL_OUTOF10: 0

## 2020-03-29 NOTE — PROGRESS NOTES
Patient received her discharge instructions. She is responsible for filling her own pill keeper. She left with her belongings and clothes in the bag. She wore her shoes, her daughter picked her up in front, patient was very happy with her care and was very happy to go home. I reminded patient to keep a bottle of water on her stand by her chair and drink until it is gone and when gone fill it up again so she has a better idea about how much she is drinking. Patient says she does not like to drink water because it makes her pee, I told her that is what it is supposed to do to help cleanse her bladder and keep infection away.

## 2020-03-29 NOTE — PLAN OF CARE
Problem: Falls - Risk of:  Goal: Will remain free from falls  Description: Will remain free from falls  3/29/2020 0654 by Virgil Medrano RN  Outcome: Ongoing  Note: Remains free from falls this shift. Problem: Urinary Elimination:  Goal: Signs and symptoms of infection will decrease  Description: Signs and symptoms of infection will decrease  3/29/2020 0654 by Virgil Medrano RN  Outcome: Ongoing  Note: Max temp 99 this shift, awaiting sensitivity results from urine culture. Problem: Serum Glucose Level - Abnormal:  Goal: Ability to maintain appropriate glucose levels will improve  Description: Ability to maintain appropriate glucose levels will improve  3/29/2020 0654 by Virgil Medrano RN  Outcome: Ongoing  Note: HS FSBS 154, correction insulin administered.

## 2020-04-26 PROBLEM — N39.0 UTI (URINARY TRACT INFECTION): Status: RESOLVED | Noted: 2020-03-27 | Resolved: 2020-04-26

## 2021-07-30 PROCEDURE — 99285 EMERGENCY DEPT VISIT HI MDM: CPT

## 2021-07-30 PROCEDURE — 96375 TX/PRO/DX INJ NEW DRUG ADDON: CPT

## 2021-07-30 PROCEDURE — 96365 THER/PROPH/DIAG IV INF INIT: CPT

## 2021-07-31 ENCOUNTER — HOSPITAL ENCOUNTER (OUTPATIENT)
Age: 86
Setting detail: OBSERVATION
Discharge: HOME | End: 2021-07-31
Attending: EMERGENCY MEDICINE | Admitting: INTERNAL MEDICINE
Payer: MEDICARE

## 2021-07-31 ENCOUNTER — APPOINTMENT (OUTPATIENT)
Dept: GENERAL RADIOLOGY | Age: 86
End: 2021-07-31
Payer: MEDICARE

## 2021-07-31 VITALS
OXYGEN SATURATION: 97 % | SYSTOLIC BLOOD PRESSURE: 143 MMHG | WEIGHT: 211.6 LBS | BODY MASS INDEX: 39.95 KG/M2 | HEIGHT: 61 IN | RESPIRATION RATE: 16 BRPM | DIASTOLIC BLOOD PRESSURE: 95 MMHG | HEART RATE: 52 BPM | TEMPERATURE: 97.4 F

## 2021-07-31 DIAGNOSIS — R94.31 ABNORMAL EKG: ICD-10-CM

## 2021-07-31 DIAGNOSIS — R42 VERTIGO: Primary | ICD-10-CM

## 2021-07-31 DIAGNOSIS — R07.89 CHEST DISCOMFORT: ICD-10-CM

## 2021-07-31 LAB
A/G RATIO: 1.4 (ref 1.1–2.2)
ALBUMIN SERPL-MCNC: 4.4 G/DL (ref 3.4–5)
ALP BLD-CCNC: 55 U/L (ref 40–129)
ALT SERPL-CCNC: 11 U/L (ref 10–40)
ANION GAP SERPL CALCULATED.3IONS-SCNC: 15 MMOL/L (ref 3–16)
AST SERPL-CCNC: 15 U/L (ref 15–37)
BACTERIA: ABNORMAL /HPF
BASOPHILS ABSOLUTE: 0 K/UL (ref 0–0.2)
BASOPHILS RELATIVE PERCENT: 0.4 %
BILIRUB SERPL-MCNC: 0.3 MG/DL (ref 0–1)
BILIRUBIN URINE: NEGATIVE
BLOOD, URINE: NEGATIVE
BUN BLDV-MCNC: 19 MG/DL (ref 7–20)
CALCIUM SERPL-MCNC: 9.4 MG/DL (ref 8.3–10.6)
CHLORIDE BLD-SCNC: 102 MMOL/L (ref 99–110)
CHOLESTEROL, TOTAL: 101 MG/DL (ref 0–199)
CLARITY: CLEAR
CO2: 22 MMOL/L (ref 21–32)
COLOR: YELLOW
CREAT SERPL-MCNC: 1.1 MG/DL (ref 0.6–1.2)
EKG ATRIAL RATE: 55 BPM
EKG ATRIAL RATE: 59 BPM
EKG DIAGNOSIS: NORMAL
EKG DIAGNOSIS: NORMAL
EKG P AXIS: 2 DEGREES
EKG P AXIS: 57 DEGREES
EKG P-R INTERVAL: 144 MS
EKG P-R INTERVAL: 148 MS
EKG Q-T INTERVAL: 408 MS
EKG Q-T INTERVAL: 432 MS
EKG QRS DURATION: 82 MS
EKG QRS DURATION: 94 MS
EKG QTC CALCULATION (BAZETT): 403 MS
EKG QTC CALCULATION (BAZETT): 413 MS
EKG R AXIS: 16 DEGREES
EKG R AXIS: 2 DEGREES
EKG T AXIS: 115 DEGREES
EKG T AXIS: 116 DEGREES
EKG VENTRICULAR RATE: 55 BPM
EKG VENTRICULAR RATE: 59 BPM
EOSINOPHILS ABSOLUTE: 0.1 K/UL (ref 0–0.6)
EOSINOPHILS RELATIVE PERCENT: 0.6 %
EPITHELIAL CELLS, UA: 1 /HPF (ref 0–5)
ESTIMATED AVERAGE GLUCOSE: 180 MG/DL
GFR AFRICAN AMERICAN: 57
GFR NON-AFRICAN AMERICAN: 47
GLOBULIN: 3.2 G/DL
GLUCOSE BLD-MCNC: 127 MG/DL (ref 70–99)
GLUCOSE BLD-MCNC: 143 MG/DL (ref 70–99)
GLUCOSE BLD-MCNC: 153 MG/DL (ref 70–99)
GLUCOSE URINE: 100 MG/DL
HBA1C MFR BLD: 7.9 %
HCT VFR BLD CALC: 31.6 % (ref 36–48)
HDLC SERPL-MCNC: 41 MG/DL (ref 40–60)
HEMOGLOBIN: 9.9 G/DL (ref 12–16)
HYALINE CASTS: 0 /LPF (ref 0–8)
KETONES, URINE: ABNORMAL MG/DL
LDL CHOLESTEROL CALCULATED: 50 MG/DL
LEUKOCYTE ESTERASE, URINE: NEGATIVE
LIPASE: 28 U/L (ref 13–60)
LYMPHOCYTES ABSOLUTE: 1.8 K/UL (ref 1–5.1)
LYMPHOCYTES RELATIVE PERCENT: 19.8 %
MCH RBC QN AUTO: 25.4 PG (ref 26–34)
MCHC RBC AUTO-ENTMCNC: 31.3 G/DL (ref 31–36)
MCV RBC AUTO: 80.9 FL (ref 80–100)
MICROSCOPIC EXAMINATION: YES
MONOCYTES ABSOLUTE: 0.4 K/UL (ref 0–1.3)
MONOCYTES RELATIVE PERCENT: 4.7 %
NEUTROPHILS ABSOLUTE: 6.9 K/UL (ref 1.7–7.7)
NEUTROPHILS RELATIVE PERCENT: 74.5 %
NITRITE, URINE: POSITIVE
PDW BLD-RTO: 18.1 % (ref 12.4–15.4)
PERFORMED ON: ABNORMAL
PERFORMED ON: ABNORMAL
PH UA: 5.5 (ref 5–8)
PLATELET # BLD: 288 K/UL (ref 135–450)
PMV BLD AUTO: 8.1 FL (ref 5–10.5)
POTASSIUM SERPL-SCNC: 4.6 MMOL/L (ref 3.5–5.1)
PROTEIN UA: NEGATIVE MG/DL
RBC # BLD: 3.9 M/UL (ref 4–5.2)
RBC UA: 1 /HPF (ref 0–4)
SODIUM BLD-SCNC: 139 MMOL/L (ref 136–145)
SPECIFIC GRAVITY UA: 1.01 (ref 1–1.03)
TOTAL PROTEIN: 7.6 G/DL (ref 6.4–8.2)
TRIGL SERPL-MCNC: 50 MG/DL (ref 0–150)
TROPONIN: <0.01 NG/ML
URINE REFLEX TO CULTURE: ABNORMAL
URINE TYPE: ABNORMAL
UROBILINOGEN, URINE: 1 E.U./DL
VLDLC SERPL CALC-MCNC: 10 MG/DL
WBC # BLD: 9.3 K/UL (ref 4–11)
WBC UA: 4 /HPF (ref 0–5)

## 2021-07-31 PROCEDURE — 83036 HEMOGLOBIN GLYCOSYLATED A1C: CPT

## 2021-07-31 PROCEDURE — 2580000003 HC RX 258: Performed by: EMERGENCY MEDICINE

## 2021-07-31 PROCEDURE — 93005 ELECTROCARDIOGRAM TRACING: CPT | Performed by: EMERGENCY MEDICINE

## 2021-07-31 PROCEDURE — G0378 HOSPITAL OBSERVATION PER HR: HCPCS

## 2021-07-31 PROCEDURE — 80061 LIPID PANEL: CPT

## 2021-07-31 PROCEDURE — 94760 N-INVAS EAR/PLS OXIMETRY 1: CPT

## 2021-07-31 PROCEDURE — 71045 X-RAY EXAM CHEST 1 VIEW: CPT

## 2021-07-31 PROCEDURE — 96375 TX/PRO/DX INJ NEW DRUG ADDON: CPT

## 2021-07-31 PROCEDURE — 93010 ELECTROCARDIOGRAM REPORT: CPT | Performed by: INTERNAL MEDICINE

## 2021-07-31 PROCEDURE — 85025 COMPLETE CBC W/AUTO DIFF WBC: CPT

## 2021-07-31 PROCEDURE — 36415 COLL VENOUS BLD VENIPUNCTURE: CPT

## 2021-07-31 PROCEDURE — 99204 OFFICE O/P NEW MOD 45 MIN: CPT | Performed by: INTERNAL MEDICINE

## 2021-07-31 PROCEDURE — 83690 ASSAY OF LIPASE: CPT

## 2021-07-31 PROCEDURE — 6370000000 HC RX 637 (ALT 250 FOR IP): Performed by: EMERGENCY MEDICINE

## 2021-07-31 PROCEDURE — 81001 URINALYSIS AUTO W/SCOPE: CPT

## 2021-07-31 PROCEDURE — 96365 THER/PROPH/DIAG IV INF INIT: CPT

## 2021-07-31 PROCEDURE — 80053 COMPREHEN METABOLIC PANEL: CPT

## 2021-07-31 PROCEDURE — 6360000002 HC RX W HCPCS: Performed by: EMERGENCY MEDICINE

## 2021-07-31 PROCEDURE — 84484 ASSAY OF TROPONIN QUANT: CPT

## 2021-07-31 PROCEDURE — 6370000000 HC RX 637 (ALT 250 FOR IP): Performed by: INTERNAL MEDICINE

## 2021-07-31 RX ORDER — CEPHALEXIN 250 MG/1
500 CAPSULE ORAL EVERY 12 HOURS SCHEDULED
Status: DISCONTINUED | OUTPATIENT
Start: 2021-07-31 | End: 2021-07-31

## 2021-07-31 RX ORDER — NITROGLYCERIN 0.4 MG/1
0.4 TABLET SUBLINGUAL EVERY 5 MIN PRN
Status: DISCONTINUED | OUTPATIENT
Start: 2021-07-31 | End: 2021-07-31 | Stop reason: HOSPADM

## 2021-07-31 RX ORDER — SODIUM CHLORIDE 0.9 % (FLUSH) 0.9 %
10 SYRINGE (ML) INJECTION EVERY 12 HOURS SCHEDULED
Status: DISCONTINUED | OUTPATIENT
Start: 2021-07-31 | End: 2021-07-31 | Stop reason: HOSPADM

## 2021-07-31 RX ORDER — ACETAMINOPHEN 325 MG/1
650 TABLET ORAL EVERY 6 HOURS PRN
Status: DISCONTINUED | OUTPATIENT
Start: 2021-07-31 | End: 2021-07-31 | Stop reason: HOSPADM

## 2021-07-31 RX ORDER — DEXTROSE MONOHYDRATE 25 G/50ML
12.5 INJECTION, SOLUTION INTRAVENOUS PRN
Status: DISCONTINUED | OUTPATIENT
Start: 2021-07-31 | End: 2021-07-31 | Stop reason: HOSPADM

## 2021-07-31 RX ORDER — INSULIN LISPRO 100 [IU]/ML
0-6 INJECTION, SOLUTION INTRAVENOUS; SUBCUTANEOUS
Status: DISCONTINUED | OUTPATIENT
Start: 2021-07-31 | End: 2021-07-31 | Stop reason: HOSPADM

## 2021-07-31 RX ORDER — NICOTINE POLACRILEX 4 MG
15 LOZENGE BUCCAL PRN
Status: DISCONTINUED | OUTPATIENT
Start: 2021-07-31 | End: 2021-07-31 | Stop reason: HOSPADM

## 2021-07-31 RX ORDER — ACETAMINOPHEN 650 MG/1
650 SUPPOSITORY RECTAL EVERY 6 HOURS PRN
Status: DISCONTINUED | OUTPATIENT
Start: 2021-07-31 | End: 2021-07-31 | Stop reason: HOSPADM

## 2021-07-31 RX ORDER — MECLIZINE HCL 12.5 MG/1
25 TABLET ORAL ONCE
Status: COMPLETED | OUTPATIENT
Start: 2021-07-31 | End: 2021-07-31

## 2021-07-31 RX ORDER — INSULIN LISPRO 100 [IU]/ML
0-3 INJECTION, SOLUTION INTRAVENOUS; SUBCUTANEOUS NIGHTLY
Status: DISCONTINUED | OUTPATIENT
Start: 2021-07-31 | End: 2021-07-31 | Stop reason: HOSPADM

## 2021-07-31 RX ORDER — SODIUM CHLORIDE 9 MG/ML
25 INJECTION, SOLUTION INTRAVENOUS PRN
Status: DISCONTINUED | OUTPATIENT
Start: 2021-07-31 | End: 2021-07-31 | Stop reason: HOSPADM

## 2021-07-31 RX ORDER — INSULIN LISPRO 100 [IU]/ML
0-6 INJECTION, SOLUTION INTRAVENOUS; SUBCUTANEOUS EVERY 4 HOURS
Status: DISCONTINUED | OUTPATIENT
Start: 2021-07-31 | End: 2021-07-31 | Stop reason: HOSPADM

## 2021-07-31 RX ORDER — ASPIRIN 81 MG/1
81 TABLET, CHEWABLE ORAL DAILY
Status: DISCONTINUED | OUTPATIENT
Start: 2021-07-31 | End: 2021-07-31 | Stop reason: HOSPADM

## 2021-07-31 RX ORDER — ROSUVASTATIN CALCIUM 10 MG/1
10 TABLET, COATED ORAL NIGHTLY
Status: DISCONTINUED | OUTPATIENT
Start: 2021-07-31 | End: 2021-07-31 | Stop reason: HOSPADM

## 2021-07-31 RX ORDER — SODIUM CHLORIDE 0.9 % (FLUSH) 0.9 %
10 SYRINGE (ML) INJECTION PRN
Status: DISCONTINUED | OUTPATIENT
Start: 2021-07-31 | End: 2021-07-31 | Stop reason: HOSPADM

## 2021-07-31 RX ORDER — DEXTROSE MONOHYDRATE 50 MG/ML
100 INJECTION, SOLUTION INTRAVENOUS PRN
Status: DISCONTINUED | OUTPATIENT
Start: 2021-07-31 | End: 2021-07-31 | Stop reason: HOSPADM

## 2021-07-31 RX ORDER — ONDANSETRON 2 MG/ML
8 INJECTION INTRAMUSCULAR; INTRAVENOUS ONCE
Status: COMPLETED | OUTPATIENT
Start: 2021-07-31 | End: 2021-07-31

## 2021-07-31 RX ORDER — PANTOPRAZOLE SODIUM 40 MG/1
40 TABLET, DELAYED RELEASE ORAL
Status: DISCONTINUED | OUTPATIENT
Start: 2021-07-31 | End: 2021-07-31 | Stop reason: HOSPADM

## 2021-07-31 RX ORDER — CLOPIDOGREL BISULFATE 75 MG/1
75 TABLET ORAL DAILY
Status: DISCONTINUED | OUTPATIENT
Start: 2021-07-31 | End: 2021-07-31 | Stop reason: HOSPADM

## 2021-07-31 RX ORDER — PANTOPRAZOLE SODIUM 20 MG/1
20 TABLET, DELAYED RELEASE ORAL
Status: DISCONTINUED | OUTPATIENT
Start: 2021-07-31 | End: 2021-07-31 | Stop reason: CLARIF

## 2021-07-31 RX ORDER — POLYETHYLENE GLYCOL 3350 17 G/17G
17 POWDER, FOR SOLUTION ORAL DAILY PRN
Status: DISCONTINUED | OUTPATIENT
Start: 2021-07-31 | End: 2021-07-31 | Stop reason: HOSPADM

## 2021-07-31 RX ADMIN — ONDANSETRON 8 MG: 2 INJECTION INTRAMUSCULAR; INTRAVENOUS at 02:27

## 2021-07-31 RX ADMIN — METOPROLOL TARTRATE 25 MG: 25 TABLET, FILM COATED ORAL at 11:26

## 2021-07-31 RX ADMIN — CEFEPIME HYDROCHLORIDE 2000 MG: 2 INJECTION, POWDER, FOR SOLUTION INTRAVENOUS at 04:48

## 2021-07-31 RX ADMIN — CLOPIDOGREL BISULFATE 75 MG: 75 TABLET ORAL at 11:27

## 2021-07-31 RX ADMIN — ASPIRIN 81 MG: 81 TABLET, CHEWABLE ORAL at 11:26

## 2021-07-31 RX ADMIN — PANTOPRAZOLE SODIUM 40 MG: 40 TABLET, DELAYED RELEASE ORAL at 11:27

## 2021-07-31 RX ADMIN — MECLIZINE 25 MG: 12.5 TABLET ORAL at 02:27

## 2021-07-31 ASSESSMENT — PAIN SCALES - GENERAL
PAINLEVEL_OUTOF10: 4
PAINLEVEL_OUTOF10: 0

## 2021-07-31 NOTE — ED NOTES
Tele box 3A4 placed on patient. Unit notified.       Jaron Jara RN  07/31/21 [address], [address] [facility]  07/31/21 0446

## 2021-07-31 NOTE — DISCHARGE SUMMARY
Hospital Medicine Discharge Summary    Patient ID: Palmer Waters      Patient's PCP: Cy Parsons    Admit Date: 7/31/2021     Discharge Date:   7/31/2021    Admitting Physician: Mitra Calloway MD     Discharge Physician: Oscar Medina MD     Discharge Diagnoses: Active Hospital Problems    Diagnosis     Abnormal EKG [R94.31]        The patient was seen and examined on day of discharge and this discharge summary is in conjunction with any daily progress note from day of discharge. Hospital Course: 80 y.o. female with history of CKD stage III, CAD, diabetes type 2 with hyperglycemia, hyperlipidemia, essential hypertension, who presents to the emergency room with multiple complaints, including dizziness, which was followed by nausea and generalized weakness. It was also reported that patient has had mild intermittent chest discomfort recently; however, patient and daughter denied any recent chest pain. Daughter things she may have UTI because she gets it quite often, most often without clear urinary symptoms. She currently does not have urinary symptoms at this time. Daughter states most times she has dizziness and that may be a clue for them that she has UTI.     Urinalysis obtained in the emergency room was abnormal (but in the absence of clear urinary symptoms). Hospital medicine service consulted for admission for cardiac evaluation/stress test, given recent chest pain. Patient admitted in view of abnormal EKG  Abnormal EKG. Inverted Tw seen in previous EKG as well. Patient does NOT have chest pain. Check serial trop. Continue cardiac meds, including antiplatelet therapies, BB and statin. Patient and daughter would like cardio input before agreeing to a stress test.  Patient seen by cardiology, after discussion with family decided against stress testing. Patient will have echocardiogram performed on outpatient basis and follow-up.       Physical Exam Performed:     BP (!) 143/95 Pulse 52   Temp 97.4 °F (36.3 °C) (Axillary)   Resp 16   Ht 5' 1\" (1.549 m)   Wt 211 lb 9.6 oz (96 kg)   SpO2 97%   BMI 39.98 kg/m²       General appearance:  No apparent distress, appears stated age and cooperative. HEENT:  Normal cephalic, atraumatic without obvious deformity. Pupils equal, round, and reactive to light. Extra ocular muscles intact. Conjunctivae/corneas clear. Neck: Supple, with full range of motion. No jugular venous distention. Trachea midline. Respiratory:  Normal respiratory effort. Clear to auscultation, bilaterally without Rales/Wheezes/Rhonchi. Cardiovascular:  Regular rate and rhythm with normal S1/S2 without murmurs, rubs or gallops. Abdomen: Soft, non-tender, non-distended with normal bowel sounds. Musculoskeletal:  No clubbing, cyanosis or edema bilaterally. Full range of motion without deformity. Skin: Skin color, texture, turgor normal.  No rashes or lesions. Neurologic:  Neurovascularly intact without any focal sensory/motor deficits. Cranial nerves: II-XII intact, grossly non-focal.  Psychiatric:  Alert and oriented, thought content appropriate, normal insight  Capillary Refill: Brisk,< 3 seconds   Peripheral Pulses: +2 palpable, equal bilaterally       Labs: For convenience and continuity at follow-up the following most recent labs are provided:      CBC:    Lab Results   Component Value Date    WBC 9.3 07/31/2021    HGB 9.9 07/31/2021    HCT 31.6 07/31/2021     07/31/2021       Renal:    Lab Results   Component Value Date     07/31/2021    K 4.6 07/31/2021     07/31/2021    CO2 22 07/31/2021    BUN 19 07/31/2021    CREATININE 1.1 07/31/2021    CALCIUM 9.4 07/31/2021         Significant Diagnostic Studies    Radiology:   XR CHEST PORTABLE   Final Result   No acute disease. Cardiomegaly.                 Consults:     IP CONSULT TO CARDIOLOGY    Disposition: Home    Condition at Discharge: Stable    Discharge Instructions/Follow-up: PCP  Cardiology    Code Status:  Full Code     Activity: activity as tolerated    Diet: cardiac diet      Discharge Medications:     Current Discharge Medication List           Details   diclofenac sodium (VOLTAREN) 1 % GEL Apply 2 g topically 4 times daily as needed for Pain      nateglinide (STARLIX) 120 MG tablet Take 120 mg by mouth 3 times daily (before meals)      sitaGLIPtan-metformin (JANUMET)  MG per tablet Take 1 tablet by mouth 2 times daily (with meals)       metoprolol (LOPRESSOR) 25 MG tablet Take 25 mg by mouth 2 times daily       nitroGLYCERIN (NITROSTAT) 0.4 MG SL tablet Place 1 tablet under the tongue every 5 minutes as needed for Chest pain. Qty: 25 tablet, Refills: 0      rosuvastatin (CRESTOR) 10 MG tablet Take 10 mg by mouth daily. clopidogrel (PLAVIX) 75 MG tablet Take 75 mg by mouth daily. lansoprazole (PREVACID) 30 MG capsule Take 30 mg by mouth daily. aspirin 81 MG chewable tablet Take 81 mg by mouth daily. Time Spent on discharge is more than 30 minutes in the examination, evaluation, counseling and review of medications and discharge plan.       Signed:    Electronically signed by Agata Vogt MD on 7/31/2021 at 1:42 PM

## 2021-07-31 NOTE — ED PROVIDER NOTES
[facility] [facility]    Patient Identification  Pt Name: Ronak Portillo  MRN: [id number]  Yfngfnoe 1935  Date of evaluation: 7/30/2021  Provider: Shazia Hernandez MD  PCP: [facility]    Chief Complaint  Nausea (pt with nausea and weakness that started tonight. )    HPI  (History provided by patient and daughter)  This is a 80 y.o. female who was brought in by self for sudden onset of vertigo, nausea, and generalized weakness. Patient had mild chest discomfort, but this eventually resolved. Patient denies associated shortness of breath, numbness, tingling, focal weakness, speech changes, confusion, visual changes. Patient has had vertigo before, but never in this combination of symptoms. She has extensive history of CAD, but is not had recent testing for this. Daughter suggested possibility of urinary tract infection as patient has had similar symptoms with this before, but patient has not had dysuria, hematuria, urinary frequency, or urinary hesitancy. She is not had fever or chills. She is not having cough, abdominal pain, or other symptoms. Although she has been nauseous, she has not vomited. [place] ROS  10 systems reviewed, pertinent positives/negatives per HPI otherwise noted to be negative.     I have reviewed the following nursing documentation:  Allergies: Azithromycin, Canagliflozin, Codeine, Cortisone, Hydrocodone-acetaminophen, Lisinopril, Tramadol, and Celecoxib    Past medical history:   Past Medical History:   Diagnosis Date    [facility]     Arthritis     hands    CAD (coronary artery disease)     stents    Diabetes mellitus ([facility]     GERD (gastroesophageal reflux disease)     History of blood transfusion     R/T previous surgeries    Hyperlipidemia     Hypertension     Nausea & vomiting      Past surgical history:   Past Surgical History:   Procedure Laterality Date    BACK SURGERY      BREAST SURGERY      CARDIAC SURGERY      STENTS X3  CHOLECYSTECTOMY, LAPAROSCOPIC      EYE SURGERY      KNEE SURGERY      right       Home medications:   Previous Medications    ASPIRIN 81 MG CHEWABLE TABLET    Take 81 mg by mouth daily. CLOPIDOGREL (PLAVIX) 75 MG TABLET    Take 75 mg by mouth daily. DICLOFENAC SODIUM (VOLTAREN) 1 % GEL    Apply 2 g topically 4 times daily as needed for Pain    LANSOPRAZOLE (PREVACID) 30 MG CAPSULE    Take 30 mg by mouth daily. METOPROLOL (LOPRESSOR) 25 MG TABLET    Take 25 mg by mouth 2 times daily     NATEGLINIDE (STARLIX) 120 MG TABLET    Take 120 mg by mouth 3 times daily (before meals)    NITROGLYCERIN (NITROSTAT) 0.4 MG SL TABLET    Place 1 tablet under the tongue every 5 minutes as needed for Chest pain. ROSUVASTATIN (CRESTOR) 10 MG TABLET    Take 10 mg by mouth daily. SITAGLIPTAN-METFORMIN (JANUMET)  MG PER TABLET    Take 1 tablet by mouth 2 times daily (with meals)        Social history:  reports that she quit smoking about 28 years ago. She has a 15.00 pack-year smoking history. She has never used smokeless tobacco. She reports that she does not drink alcohol and does not use drugs. Family history:    Family History   Problem Relation Age of Onset    Diabetes Mother     Heart Disease Mother     Other Grandchild         VonWillebrand    Anesth Problems Neg Hx     Malig Hyperten Neg Hx     Hypotension Neg Hx     Malig Hypertherm Neg Hx     Pseudochol. Deficiency Neg Hx     High Blood Pressure Neg Hx     High Cholesterol Neg Hx      Exam  ED Triage Vitals [07/31/21 0008]   BP Temp Temp Source Pulse Resp SpO2 Height Weight   (!) 153/68 97.8 °F (36.6 °C) Infrared 67 16 98 % -- 212 lb (96.2 kg)     Nursing note and vitals reviewed. Constitutional: Well developed, well nourished. Non-toxic in appearance. HENT:      Head: Normocephalic and atraumatic. Ears: External ears normal.      Nose: Nose normal.     Mouth: Membrane mucosa moist and pink. Eyes: Anicteric sclera. No discharge. No visual field deficits as tested by confrontation in each eye individually. Neck: Supple. Trachea midline. Cardiovascular: RRR; no murmurs, rubs, or gallops. Pulmonary/Chest: Effort normal. No respiratory distress. CTAB. No stridor. No wheezes. No rales. Abdominal: Soft. No distension. Non-tender to palpation. Musculoskeletal: Moves all extremities. No gross deformity. Neurological: Alert and oriented to person, place, time, and situation. Face symmetric without droop or paralysis. No drift in the bilateral upper or lower extremities. Strength normal and equal in the bilateral upper and lower extremities. Normal sensation to light touch throughout the bilateral face, upper extremities, and lower extremities. Normal finger-nose bilaterally. Normal heel-shin bilaterally. No truncal ataxia. Speech normal without aphasia or dysarthria. No neglect or gaze deviation. Negative test of skew. Skin: Warm and dry. No rash. Psychiatric: Normal mood and affect. Behavior is normal.    EKG #1  The Ekg interpreted by me in the absence of a cardiologist shows. sinus bradycardia, rate=59  Axis is   Normal  QTc is  normal  Intervals and Durations are unremarkable. T-wave inversions in I and aVL, new in comparison to previous EKG from 3/27/2020    EKG #2  The Ekg interpreted by me in the absence of a cardiologist shows. sinus bradycardia, rate=55  Axis is   Normal  QTc is  normal  LVH  T-wave inversions unchanged in comparison to previous EKG performed earlier. Radiology  XR CHEST PORTABLE   Final Result   No acute disease. Cardiomegaly.              Labs  Results for orders placed or performed during the hospital encounter of 07/31/21   CBC Auto Differential   Result Value Ref Range    WBC 9.3 4.0 - 11.0 K/uL    RBC 3.90 (L) 4.00 - 5.20 M/uL    Hemoglobin 9.9 (L) 12.0 - 16.0 g/dL    Hematocrit 31.6 (L) 36.0 - 48.0 %    MCV 80.9 80.0 - 100.0 fL    MCH 25.4 (L) 26.0 - 34.0 pg    MCHC 31.3 31.0 - 36.0 g/dL    RDW 18.1 (H) 12.4 - 15.4 %    Platelets 907 553 - 043 K/uL    MPV 8.1 5.0 - 10.5 fL    Neutrophils % 74.5 %    Lymphocytes % 19.8 %    Monocytes % 4.7 %    Eosinophils % 0.6 %    Basophils % 0.4 %    Neutrophils Absolute 6.9 1.7 - 7.7 K/uL    Lymphocytes Absolute 1.8 1.0 - 5.1 K/uL    Monocytes Absolute 0.4 0.0 - 1.3 K/uL    Eosinophils Absolute 0.1 0.0 - 0.6 K/uL    Basophils Absolute 0.0 0.0 - 0.2 K/uL   Comprehensive Metabolic Panel   Result Value Ref Range    Sodium 139 136 - 145 mmol/L    Potassium 4.6 3.5 - 5.1 mmol/L    Chloride 102 99 - 110 mmol/L    CO2 22 21 - 32 mmol/L    Anion Gap 15 3 - 16    Glucose 153 (H) 70 - 99 mg/dL    BUN 19 7 - 20 mg/dL    CREATININE 1.1 0.6 - 1.2 mg/dL    GFR Non- 47 (A) >60    GFR  57 (A) >60    Calcium 9.4 8.3 - 10.6 mg/dL    Total Protein 7.6 6.4 - 8.2 g/dL    Albumin 4.4 3.4 - 5.0 g/dL    Albumin/Globulin Ratio 1.4 1.1 - 2.2    Total Bilirubin 0.3 0.0 - 1.0 mg/dL    Alkaline Phosphatase 55 40 - 129 U/L    ALT 11 10 - 40 U/L    AST 15 15 - 37 U/L    Globulin 3.2 g/dL   Urinalysis Reflex to Culture    Specimen: Urine, clean catch   Result Value Ref Range    Urine Type NotGiven    Troponin   Result Value Ref Range    Troponin <0.01 <0.01 ng/mL   Lipase   Result Value Ref Range    Lipase 28.0 13.0 - 60.0 U/L     Screenings  NIH Stroke Scale  NIH Stroke Scale Assessed: Yes  Interval: Baseline  Level of Consciousness (1a. ): Alert  LOC Questions (1b. ):  Answers both correctly  LOC Commands (1c. ): Performs both tasks correctly  Best Gaze (2. ): Normal  Visual (3. ): No visual loss  Facial Palsy (4. ): Normal symmetrical movement  Motor Arm, Left (5a. ): No drift  Motor Arm, Right (5b. ): No drift  Motor Leg, Left (6a. ): No drift  Motor Leg, Right (6b. ): No drift  Limb Ataxia (7. ): Absent  Sensory (8. ): Normal  Best Language (9. ): No aphasia  Dysarthria (10. ): Normal  Extinction and Inattention (11): No abnormality  Total: 0      MDM and ED Course    Patient developed sudden onset of vertigo, nausea, and generalized weakness earlier today. She has had some very mild chest discomfort, but none currently. Extensive history of CAD. Last cardiac testing in 2016. This may be anginal equivalent as patient does have T-wave inversions in lateral leads which are new in comparison to March 2020. Initial trop is normal. Daughter also suggested possibility of UTI and urine is consistent, so I treated it. However, I think this is less likely the cause. NIHSS normal. I do not believe this is stroke. With NIH stroke score of 0, negative test of skew, no truncal ataxia, patient's risk of stroke is less than 1%. I believe her vertigo is representative of positional or peripheral vertigo similar to what she has had before. However, I am concerned her symptoms could be an anginal equivalent. Given the patient's age and gender, it is more likely she has atypical presentations for this. Patient and her daughter note the last time she had heart attack, symptoms are mild and she was unaware it was occurring. Of note, her EKG shows T wave inversions in the lateral leads which are new in comparison to previous EKG from March 2020, further suggesting possible cardiogenic etiology. Initial troponin is normal.  Patient's urine shows evidence of urinary tract infection, so I am initiating treatment in the emergency department given concerning symptoms. I consulted Dr. Brianna Suresh through [address] [place] [address] for admission. He reviewed the patient's history, physical exam, labs, imaging studies, and emergency department course and has decided to admit Dianne Rm for further evaluation and treatment.   As I have deemed necessary from their history, physical, and studies, I have considered and evaluated Ladona Enter for the following diagnoses:  PULMONARY EMBOLISM, ACUTE CORONARY SYNDROME, CARDIAC TAMPONADE, PNEUMOTHORAX, PNEUMONIA, PERICARDITIS, AORTIC DISSECTION/ANEURYSM, HEMOTHORAX, INTRACRANIAL HEMORRHAGE,SUBARACHNOID HEMORRHAGE, SUBDURAL HEMATOMA, STROKE, TIA, MENINGITIS, ENCEPHALITIS, SPINAL CORD INJURY/COMPRESSION/LESION, VERTEBROBASILAR INSUFFICIENCY    The total Critical Care time is 30 minutes which excludes separately billable procedures. Final Impression  1. Vertigo    2. Chest discomfort    3. Abnormal EKG        Blood pressure (!) 124/52, pulse 60, temperature 97.6 °F (36.4 °C), temperature source Axillary, resp. rate 14, weight 212 lb (96.2 kg), SpO2 98 %, not currently breastfeeding. Disposition:  Admission    This chart was generated using the [address] [place] [id number] dictation system. I created this record but it may contain dictation errors given the limitations of this technology.         Margareth Sterling MD  07/31/21 8657

## 2021-07-31 NOTE — PROGRESS NOTES
Data- discharge order received, pt verbalized agreement to discharge, disposition to previous residence, no needs for HHC/DME. Action- discharge instructions prepared and given to pt, pt verbalized understanding. Medication information packet given r/t NEW and/or CHANGED prescriptions emphasizing name/purpose/side effects, pt verbalized understanding. Discharge instruction summary: Diet- general, Activity- as karli, Primary Care Physician as followsRejiglory Manesa [phone] f/u appointment pt will make f/u, immunizations reviewed , prescription medications filled -n/a.     1. WEIGHT: Admit Weight: 212 lb (96.2 kg) (07/31/21 0008)        Today  Weight: 211 lb 9.6 oz (96 kg) (07/31/21 1100)       2. O2 SAT.: SpO2: 97 % (07/31/21 0900)    Response- Pt belongings gathered, IV removed. Disposition is home (no HHC/DME needs), transported with daughter, taken to lobby via w/c w/ this RN, no complications.

## 2021-07-31 NOTE — CONSULTS
[id number]    Abnormal EKG  Weakness  Nausea  Dizziness  CAD  S/P CABG  HTN  HLD  Anemia    Echo - can be done as OP  Cont DAPT and stain  Declines ischemic eval

## 2021-07-31 NOTE — ED NOTES
Report given to Warren Billingsley RN on receiving unit. Pt and daughter aware. Pt to be transported to unit after completion of change of shift report. Will pass on to oncoming ED RN to arrange for transport to unit. Tele box requested.      Janna Sheikh RN  07/31/21 [address] [place] [address] Dr RN  07/31/21 5340

## 2021-07-31 NOTE — H&P
Hospital Medicine History and Physical    7/31/2021    Date of Admission: 7/31/2021    Date of Service: Pt seen/examined on 7/31/2021 and admitted to observation. Assessment/plan:  1. Abnormal EKG. Inverted Tw seen in previous EKG as well. Patient does NOT have chest pain. Check serial trop. Continue cardiac meds, including antiplatelet therapies, BB and statin. Patient and daughter would like cardio input before agreeing to a stress test.  2. Dizziness. This was followed by nausea, generalized weakness. Check orthostatic vitals. Maintain on fall precautions. 3. Asymptomatic bacteriuria. Patient received a dose of antibiotics in ER. She does not have urinary symptoms. Will hold further antibiotics at this time; further indication for antibiotics may be reviewed by daytime MD.  4. Other comorbidities: history of CKD stage III, CAD, diabetes type 2 with hyperglycemia, hyperlipidemia, essential hypertension. Activities: Up with assist  Prophylaxis: Subcutaneous anticoagulant  Code status: Full code    ==========================================================  Chief complaint:  Chief Complaint   Patient presents with    Nausea     pt with nausea and weakness that started tonight. History of Presenting Illness: This is a pleasant 80 y.o. female with history of CKD stage III, CAD, diabetes type 2 with hyperglycemia, hyperlipidemia, essential hypertension, who presents to the emergency room with multiple complaints, including dizziness, which was followed by nausea and generalized weakness. It was also reported that patient has had mild intermittent chest discomfort recently; however, patient and daughter denied any recent chest pain. Daughter things she may have UTI because she gets it quite often, most often without clear urinary symptoms. She currently does not have urinary symptoms at this time.  Daughter states most times she has dizziness and that may be a clue for them that she has UTI.    Urinalysis obtained in the emergency room was abnormal (but in the absence of clear urinary symptoms). Hospital medicine service consulted for admission for cardiac evaluation/stress test, given recent chest pain. Past Medical History:      Diagnosis Date    [facility] ([facility]     Arthritis     hands    CAD (coronary artery disease)     stents    Diabetes mellitus ([facility]     GERD (gastroesophageal reflux disease)     History of blood transfusion     R/T previous surgeries    Hyperlipidemia     Hypertension     Nausea & vomiting        Past Surgical History:      Procedure Laterality Date    BACK SURGERY      BREAST SURGERY      CARDIAC SURGERY      STENTS X3    CHOLECYSTECTOMY, LAPAROSCOPIC      EYE SURGERY      KNEE SURGERY      right       Medications (prior to admission):  Prior to Admission medications    Medication Sig Start Date End Date Taking? Authorizing Provider   diclofenac sodium (VOLTAREN) 1 % GEL Apply 2 g topically 4 times daily as needed for Pain   Yes Historical Provider, MD   nateglinide (STARLIX) 120 MG tablet Take 120 mg by mouth 3 times daily (before meals)   Yes Historical Provider, MD   sitaGLIPtan-metformin (JANUMET)  MG per tablet Take 1 tablet by mouth 2 times daily (with meals)  5/19/16  Yes Historical Provider, MD   metoprolol (LOPRESSOR) 25 MG tablet Take 25 mg by mouth 2 times daily  5/19/16  Yes Historical Provider, MD   nitroGLYCERIN (NITROSTAT) 0.4 MG SL tablet Place 1 tablet under the tongue every 5 minutes as needed for Chest pain. 9/12/13  Yes Ale Serrano PA-C   rosuvastatin (CRESTOR) 10 MG tablet Take 10 mg by mouth daily. Yes Historical Provider, MD   clopidogrel (PLAVIX) 75 MG tablet Take 75 mg by mouth daily. Yes Historical Provider, MD   lansoprazole (PREVACID) 30 MG capsule Take 30 mg by mouth daily. Yes Historical Provider, MD   aspirin 81 MG chewable tablet Take 81 mg by mouth daily.    Yes Historical Provider, MD Allergy(ies):  Azithromycin, Canagliflozin, Codeine, Cortisone, Hydrocodone-acetaminophen, Lisinopril, Tramadol, and Celecoxib    Social History:  TOBACCO:  reports that she quit smoking about 28 years ago. She has a 15.00 pack-year smoking history. She has never used smokeless tobacco.  ETOH:  reports no history of alcohol use. Family History:      Problem Relation Age of Onset    Diabetes Mother     Heart Disease Mother     Other Grandchild         VonWillebrand    Anesth Problems Neg Hx     Malig Hyperten Neg Hx     Hypotension Neg Hx     Malig Hypertherm Neg Hx     Pseudochol. Deficiency Neg Hx     High Blood Pressure Neg Hx     High Cholesterol Neg Hx        Review of Systems:  Pertinent positives are listed in HPI. At least 10-point ROS reviewed and were negative. Vitals and physical examination:  /70   Pulse 57   Temp 97.6 °F (36.4 °C) (Axillary)   Resp 16   Wt 212 lb (96.2 kg)   SpO2 100%   BMI 38.78 kg/m²   Gen/overall appearance: Not in acute distress. Alert. Oriented X3  Head: Normocephalic, atraumatic  Eyes: EOMI, good acuity  ENT: Oral mucosa moist  Neck: No JVD, thyromegaly  CVS: Nml S1S2, no MRG, RRR  Pulm: Clear bilaterally. No crackles/wheezes  Gastrointestinal: Soft, NT/ND, +BS  Musculoskeletal: Trace bilateral LE edema. Warm  Neuro: No focal deficit. Moves extremity spontaneously. Psychiatry: Appropriate affect. Not agitated. Skin: Warm, dry with normal turgor.  No rash  Capillary refill: Brisk,< 3 seconds   Peripheral Pulses: +2 palpable, equal bilaterally       Labs/imaging/EKG:  CBC:   Recent Labs     07/31/21  0245   WBC 9.3   HGB 9.9*        BMP:    Recent Labs     07/31/21 0245      K 4.6      CO2 22   BUN 19   CREATININE 1.1   GLUCOSE 153*     Hepatic:   Recent Labs     07/31/21 0245   AST 15   ALT 11   BILITOT 0.3   ALKPHOS 55       XR CHEST PORTABLE    Result Date: 7/31/2021  EXAMINATION: ONE XRAY VIEW OF THE CHEST 7/31/2021 1:49 am COMPARISON: 03/27/2020 HISTORY: ORDERING SYSTEM PROVIDED HISTORY: nausea, discomfort TECHNOLOGIST PROVIDED HISTORY: Reason for exam:->nausea, discomfort FINDINGS: The lungs are clear. The heart size is mildly enlarged. Sternal wires are again seen. The costophrenic angles are sharp. There is no discernible pneumothorax. No acute disease. Cardiomegaly. EKG: Sinus bradycardia with sinus arrhythmia, rate 55 beats per minutes. Inverted T waves in high lateral leads, seen on EKG from 4/12/2016 and 10/11/2014. I reviewed EKG. Discussed with ER provider.       Thank you Esau Hartman for the opportunity to be involved in this patient's care.    -----------------------------  Marissa Blanc MD  [facility]

## 2021-07-31 NOTE — CONSULTS
[place] [address]                     [facility], [address]                                  CONSULTATION    PATIENT NAME: Sylvester Velasquez                       :        1935  MED REC NO:   [id number]                          ROOM:       3304  ACCOUNT NO:   [de-identified]                           ADMIT DATE: 2021  PROVIDER:     Martina Vallecillo MD    CONSULT DATE:  2021    REASON FOR CONSULTATION:  Abnormal EKG, coronary artery disease. HISTORY OF PRESENT ILLNESS:  An 80-year-old female presented to the  hospital due to nausea, weakness, dizziness. She was thought to have a  urinary tract infection. In the emergency room, all of her symptoms  seemed to improve. The daughter states this was shortly after she got  her antibiotics. Her EKG was noted to be abnormal.  For this reason,  Cardiology was consulted. The patient denies any chest pain or  shortness of breath. She states her nausea and dizziness have resolved. She still feels a little bit weak. The symptoms started the day of  admission. She has had similar symptoms in the past when she has got  urinary tract infections. Again, they have all improved with treatment  here in the hospital.  The symptoms were moderate in severity, lasted  for less than a day. PAST MEDICAL HISTORY:  1. Coronary artery disease status post coronary artery bypass surgery. 2.  Hypertension. 3.  Hyperlipidemia. 4.  Anemia. SOCIAL HISTORY:  Does not smoke. FAMILY HISTORY:  Positive for heart disease. ALLERGIES:  See list in the chart, which I reviewed. MEDICATIONS:  See list in the chart, which I reviewed. REVIEW OF SYSTEMS:  No fevers, no chills. No cough. No focal  neurologic symptoms. No headache or visual changes. No recent GI or   bleeding. No dysuria, no hematuria. All other systems are negative  except as present illness.     PHYSICAL EXAMINATION:  VITAL SIGNS:  Blood pressure is 143/95, heart rate 52, respirations 16,  temperature 97.4. She is 97% saturating on room air. GENERAL:  A well-developed, well-nourished white female in no acute  distress. HEENT:  Normocephalic and atraumatic. Oropharynx is clear. Moist  mucous membranes. NECK:  Supple. CHEST:  Clear. CARDIAC:  Regular S1 and S2. There is no S3 or S4 gallop. There is  soft systolic murmur. Jugular venous pressure is normal.  Carotids 2+  and symmetric without bruit. ABDOMEN:  Soft and nontender. Positive bowel sounds. EXTREMITIES:  No cyanosis. Trace edema. NEUROLOGIC:  Grossly nonfocal.  SKIN:  Warm and dry. PSYCHIATRIC:  Affect calm. LABORATORY DATA:  Troponin less than 0.01. Creatinine is 1.1. Hemoglobin is 9.9. DIAGNOSTIC DATA:  EKG, sinus rhythm, nonspecific T-wave abnormalities. Chest x-ray, negative for pulmonary edema. Telemetry, normal sinus  rhythm. IMPRESSION:  1. Abnormal EKG without anginal symptoms or other signs of acute  coronary syndrome. The patient noted to have similar findings in the  past.  2.  Generalized weakness, nausea, dizziness which appears may be due to  urinary tract infection, all of which have now significantly improved. 3.  Coronary artery disease status post coronary artery bypass surgery,  appears to be stable without angina at this time. 4.  Hypertension, suboptimal.  5.  Hyperlipidemia. 6.  Anemia. RECOMMENDATIONS:  1. Continue dual antiplatelet, statin therapy. 2.  Monitor blood pressure as an outpatient, consider adjusting  medications as necessary. 3.  Echocardiogram, this can be done as an outpatient. 4.  I discussed ischemic evaluation with both the patient and her  daughter. At this time, they declined. I think this is reasonable to  continue medical management without ischemic evaluation.         Robe Apodaca MD    D: 07/31/2021 11:08:18       T: 07/31/2021 12:00:24     [email or web address]  Job#: [id number]     Doc#: [id number]    CC:

## 2021-07-31 NOTE — ED NOTES
Orthostatic vitals completed. Pt tolerated well. Updated on POC. Daughter at bs.       Willie Novak RN  07/31/21 3880

## 2021-07-31 NOTE — FLOWSHEET NOTE
07/31/21 0830   Vital Signs   Temp 97.4 °F (36.3 °C)   Temp Source Axillary   Pulse 52   Heart Rate Source Monitor   Resp 16   BP (!) 143/95   BP Location Right upper arm   Patient Position Semi fowlers   Level of Consciousness Alert (0)   MEWS Score 1   Patient Currently in Pain Denies   Pain Assessment   Pain Assessment 0-10   Pain Level 0   Oxygen Therapy   SpO2 97 %   O2 Device None (Room air)   pt admitted to room 3304. Oriented to room and call light. Pt is very [place]. Daughter at bedside.

## 2022-01-06 ENCOUNTER — HOSPITAL ENCOUNTER (EMERGENCY)
Age: 87
Discharge: HOME | End: 2022-01-06
Attending: EMERGENCY MEDICINE
Payer: MEDICARE

## 2022-01-06 ENCOUNTER — APPOINTMENT (OUTPATIENT)
Dept: GENERAL RADIOLOGY | Age: 87
End: 2022-01-06
Payer: MEDICARE

## 2022-01-06 ENCOUNTER — APPOINTMENT (OUTPATIENT)
Dept: CT IMAGING | Age: 87
End: 2022-01-06
Payer: MEDICARE

## 2022-01-06 VITALS
HEIGHT: 63 IN | WEIGHT: 200 LBS | HEART RATE: 68 BPM | RESPIRATION RATE: 19 BRPM | DIASTOLIC BLOOD PRESSURE: 62 MMHG | SYSTOLIC BLOOD PRESSURE: 144 MMHG | TEMPERATURE: 97.9 F | OXYGEN SATURATION: 98 % | BODY MASS INDEX: 35.44 KG/M2

## 2022-01-06 DIAGNOSIS — R42 LIGHTHEADEDNESS: ICD-10-CM

## 2022-01-06 DIAGNOSIS — N39.0 URINARY TRACT INFECTION WITHOUT HEMATURIA, SITE UNSPECIFIED: Primary | ICD-10-CM

## 2022-01-06 DIAGNOSIS — R53.1 GENERALIZED WEAKNESS: ICD-10-CM

## 2022-01-06 LAB
A/G RATIO: 1.3 (ref 1.1–2.2)
ALBUMIN SERPL-MCNC: 4 G/DL (ref 3.4–5)
ALP BLD-CCNC: 53 U/L (ref 40–129)
ALT SERPL-CCNC: 12 U/L (ref 10–40)
ANION GAP SERPL CALCULATED.3IONS-SCNC: 13 MMOL/L (ref 3–16)
AST SERPL-CCNC: 16 U/L (ref 15–37)
BACTERIA: ABNORMAL /HPF
BASOPHILS ABSOLUTE: 0 K/UL (ref 0–0.2)
BASOPHILS RELATIVE PERCENT: 0.4 %
BILIRUB SERPL-MCNC: 0.4 MG/DL (ref 0–1)
BILIRUBIN URINE: NEGATIVE
BLOOD, URINE: NEGATIVE
BUN BLDV-MCNC: 22 MG/DL (ref 7–20)
CALCIUM SERPL-MCNC: 9.4 MG/DL (ref 8.3–10.6)
CHLORIDE BLD-SCNC: 101 MMOL/L (ref 99–110)
CLARITY: ABNORMAL
CO2: 26 MMOL/L (ref 21–32)
COLOR: YELLOW
CREAT SERPL-MCNC: 1 MG/DL (ref 0.6–1.2)
EKG ATRIAL RATE: 65 BPM
EKG ATRIAL RATE: 83 BPM
EKG DIAGNOSIS: NORMAL
EKG DIAGNOSIS: NORMAL
EKG P AXIS: 4 DEGREES
EKG P AXIS: 43 DEGREES
EKG P-R INTERVAL: 132 MS
EKG P-R INTERVAL: 144 MS
EKG Q-T INTERVAL: 386 MS
EKG Q-T INTERVAL: 410 MS
EKG QRS DURATION: 80 MS
EKG QRS DURATION: 84 MS
EKG QTC CALCULATION (BAZETT): 425 MS
EKG QTC CALCULATION (BAZETT): 426 MS
EKG R AXIS: 17 DEGREES
EKG R AXIS: 5 DEGREES
EKG T AXIS: 115 DEGREES
EKG T AXIS: 121 DEGREES
EKG VENTRICULAR RATE: 65 BPM
EKG VENTRICULAR RATE: 73 BPM
EOSINOPHILS ABSOLUTE: 0.1 K/UL (ref 0–0.6)
EOSINOPHILS RELATIVE PERCENT: 1.2 %
EPITHELIAL CELLS, UA: 1 /HPF (ref 0–5)
GFR AFRICAN AMERICAN: >60
GFR NON-AFRICAN AMERICAN: 53
GLUCOSE BLD-MCNC: 189 MG/DL (ref 70–99)
GLUCOSE URINE: NEGATIVE MG/DL
HCT VFR BLD CALC: 27.5 % (ref 36–48)
HEMOGLOBIN: 8.5 G/DL (ref 12–16)
HYALINE CASTS: 1 /LPF (ref 0–8)
KETONES, URINE: NEGATIVE MG/DL
LACTIC ACID: 2.6 MMOL/L (ref 0.4–2)
LEUKOCYTE ESTERASE, URINE: ABNORMAL
LIPASE: 19 U/L (ref 13–60)
LYMPHOCYTES ABSOLUTE: 1.4 K/UL (ref 1–5.1)
LYMPHOCYTES RELATIVE PERCENT: 18 %
MCH RBC QN AUTO: 24.5 PG (ref 26–34)
MCHC RBC AUTO-ENTMCNC: 30.8 G/DL (ref 31–36)
MCV RBC AUTO: 79.4 FL (ref 80–100)
MICROSCOPIC EXAMINATION: YES
MONOCYTES ABSOLUTE: 0.4 K/UL (ref 0–1.3)
MONOCYTES RELATIVE PERCENT: 5.3 %
NEUTROPHILS ABSOLUTE: 5.9 K/UL (ref 1.7–7.7)
NEUTROPHILS RELATIVE PERCENT: 75.1 %
NITRITE, URINE: NEGATIVE
OCCULT BLOOD DIAGNOSTIC: NORMAL
PDW BLD-RTO: 19.1 % (ref 12.4–15.4)
PH UA: 7 (ref 5–8)
PLATELET # BLD: 264 K/UL (ref 135–450)
PMV BLD AUTO: 8.1 FL (ref 5–10.5)
POTASSIUM REFLEX MAGNESIUM: 5.3 MMOL/L (ref 3.5–5.1)
PROTEIN UA: NEGATIVE MG/DL
RAPID INFLUENZA  B AGN: NEGATIVE
RAPID INFLUENZA A AGN: NEGATIVE
RBC # BLD: 3.46 M/UL (ref 4–5.2)
RBC UA: 2 /HPF (ref 0–4)
REASON FOR REJECTION: NORMAL
REJECTED TEST: NORMAL
SODIUM BLD-SCNC: 140 MMOL/L (ref 136–145)
SPECIFIC GRAVITY UA: >1.03 (ref 1–1.03)
TOTAL PROTEIN: 7 G/DL (ref 6.4–8.2)
TROPONIN: <0.01 NG/ML
URINE REFLEX TO CULTURE: YES
URINE TYPE: ABNORMAL
UROBILINOGEN, URINE: 1 E.U./DL
WBC # BLD: 7.9 K/UL (ref 4–11)
WBC UA: 32 /HPF (ref 0–5)

## 2022-01-06 PROCEDURE — 99285 EMERGENCY DEPT VISIT HI MDM: CPT

## 2022-01-06 PROCEDURE — 85025 COMPLETE CBC W/AUTO DIFF WBC: CPT

## 2022-01-06 PROCEDURE — 93010 ELECTROCARDIOGRAM REPORT: CPT | Performed by: INTERNAL MEDICINE

## 2022-01-06 PROCEDURE — 6360000002 HC RX W HCPCS: Performed by: EMERGENCY MEDICINE

## 2022-01-06 PROCEDURE — 84484 ASSAY OF TROPONIN QUANT: CPT

## 2022-01-06 PROCEDURE — 93005 ELECTROCARDIOGRAM TRACING: CPT | Performed by: EMERGENCY MEDICINE

## 2022-01-06 PROCEDURE — 82270 OCCULT BLOOD FECES: CPT

## 2022-01-06 PROCEDURE — 74177 CT ABD & PELVIS W/CONTRAST: CPT

## 2022-01-06 PROCEDURE — 36415 COLL VENOUS BLD VENIPUNCTURE: CPT

## 2022-01-06 PROCEDURE — 87088 URINE BACTERIA CULTURE: CPT

## 2022-01-06 PROCEDURE — 71045 X-RAY EXAM CHEST 1 VIEW: CPT

## 2022-01-06 PROCEDURE — 70450 CT HEAD/BRAIN W/O DYE: CPT

## 2022-01-06 PROCEDURE — 96375 TX/PRO/DX INJ NEW DRUG ADDON: CPT

## 2022-01-06 PROCEDURE — 2580000003 HC RX 258: Performed by: EMERGENCY MEDICINE

## 2022-01-06 PROCEDURE — 87186 SC STD MICRODIL/AGAR DIL: CPT

## 2022-01-06 PROCEDURE — 83605 ASSAY OF LACTIC ACID: CPT

## 2022-01-06 PROCEDURE — 87804 INFLUENZA ASSAY W/OPTIC: CPT

## 2022-01-06 PROCEDURE — 6360000004 HC RX CONTRAST MEDICATION: Performed by: EMERGENCY MEDICINE

## 2022-01-06 PROCEDURE — 81001 URINALYSIS AUTO W/SCOPE: CPT

## 2022-01-06 PROCEDURE — 80053 COMPREHEN METABOLIC PANEL: CPT

## 2022-01-06 PROCEDURE — 96365 THER/PROPH/DIAG IV INF INIT: CPT

## 2022-01-06 PROCEDURE — 83690 ASSAY OF LIPASE: CPT

## 2022-01-06 PROCEDURE — 96361 HYDRATE IV INFUSION ADD-ON: CPT

## 2022-01-06 PROCEDURE — 87086 URINE CULTURE/COLONY COUNT: CPT

## 2022-01-06 RX ORDER — ONDANSETRON 8 MG/1
8 TABLET, ORALLY DISINTEGRATING ORAL EVERY 8 HOURS PRN
Qty: 10 TABLET | Refills: 0 | Status: SHIPPED | OUTPATIENT
Start: 2022-01-06 | End: 2022-01-06 | Stop reason: SDUPTHER

## 2022-01-06 RX ORDER — SODIUM CHLORIDE, SODIUM LACTATE, POTASSIUM CHLORIDE, AND CALCIUM CHLORIDE .6; .31; .03; .02 G/100ML; G/100ML; G/100ML; G/100ML
1000 INJECTION, SOLUTION INTRAVENOUS ONCE
Status: COMPLETED | OUTPATIENT
Start: 2022-01-06 | End: 2022-01-06

## 2022-01-06 RX ORDER — ONDANSETRON 8 MG/1
8 TABLET, ORALLY DISINTEGRATING ORAL EVERY 8 HOURS PRN
Qty: 10 TABLET | Refills: 0 | Status: SHIPPED | OUTPATIENT
Start: 2022-01-06

## 2022-01-06 RX ORDER — CEFUROXIME AXETIL 500 MG/1
500 TABLET ORAL 2 TIMES DAILY
Qty: 14 TABLET | Refills: 0 | Status: SHIPPED | OUTPATIENT
Start: 2022-01-06 | End: 2022-01-06 | Stop reason: SDUPTHER

## 2022-01-06 RX ORDER — CEFUROXIME AXETIL 500 MG/1
500 TABLET ORAL 2 TIMES DAILY
Qty: 14 TABLET | Refills: 0 | Status: SHIPPED | OUTPATIENT
Start: 2022-01-06 | End: 2022-01-13

## 2022-01-06 RX ORDER — ONDANSETRON 2 MG/ML
8 INJECTION INTRAMUSCULAR; INTRAVENOUS ONCE
Status: COMPLETED | OUTPATIENT
Start: 2022-01-06 | End: 2022-01-06

## 2022-01-06 RX ADMIN — IOPAMIDOL 75 ML: 755 INJECTION, SOLUTION INTRAVENOUS at 09:15

## 2022-01-06 RX ADMIN — ONDANSETRON 8 MG: 2 INJECTION INTRAMUSCULAR; INTRAVENOUS at 08:03

## 2022-01-06 RX ADMIN — CEFEPIME HYDROCHLORIDE 2000 MG: 2 INJECTION, POWDER, FOR SOLUTION INTRAVENOUS at 12:27

## 2022-01-06 RX ADMIN — SODIUM CHLORIDE, POTASSIUM CHLORIDE, SODIUM LACTATE AND CALCIUM CHLORIDE 1000 ML: 600; 310; 30; 20 INJECTION, SOLUTION INTRAVENOUS at 12:25

## 2022-01-06 NOTE — ED NOTES
Bed: 20  Expected date:   Expected time:   Means of arrival: Walk In  Comments:     Lobito Green, RN  01/06/22 1906

## 2022-01-06 NOTE — ED PROVIDER NOTES
Printer was not working; nurse asked me to print the patients prescriptions at my desk. I did not see the patient. Dr. Zaheer Diggs was with a sick patient at the time and could not do this.       Shannno Baig MD  01/06/22 3290

## 2022-01-06 NOTE — ED NOTES
Three RN's attempt PIV access without success. Pt difficult stick. MD notified.       Ema Dodd RN  01/06/22 0857

## 2022-01-06 NOTE — ED NOTES
EKG completed, repeat completed per MD order. Endorsed to day RN. Aware of need for PIV and pending med and lactic acid.         Agustín Gutierrez RN  01/06/22 7616

## 2022-01-06 NOTE — ED NOTES
Unable to establish IV access after 3 attempts, ultrasound guided, IV starts have good blood return but swell when flushed, IV removed will inform MD.     Sanjuan Schirmer, RN  01/06/22 1887

## 2022-01-06 NOTE — ED PROVIDER NOTES
[address] PROVIDER NOTE    Patient Identification  Pt Name: Iam Clarke  MRN: [id number]  Susana 1935  Date of evaluation: 1/6/2022  Provider: Ren Reid MD  PCP: [facility]    Chief Complaint  Fatigue (Endorses 3-4 days of generalized weakness worsening tonight w/associated intermittent nausea) and Dizziness (Dizziness upon standing. AAOx4, ambulatory, and self-care at baseline at home)      HPI  (History provided by patient and daughter)  This is a 80 y.o. female who was brought in by self for generalized weakness, nausea, and epigastric abdominal pain. Patient symptoms have been present for the last 3 to 4 days. She had decreased appetite and decreased oral intake. She feels lightheaded and woozy on standing. She feels unsteady when she walks, but on the wall to prevent her from falling. Despite this, she denies any vertigo or motion like dizziness. She denies focal numbness, focal tingling, focal weakness. She is not had a headache nor she fallen or passed out. She denies chest pain and shortness of breath. She has not had dysuria or hematuria. She denies stool changes, dark stools, and vomiting. She is not had fever or chills. She has received both doses of her Covid vaccination as well as a booster shot. She has had no recent sick contacts. Pain is constant, localized to the epigastrium, without radiation, and rated 8/10. [facility] ROS  10 systems reviewed, pertinent positives/negatives per HPI otherwise noted to be negative.     I have reviewed the following nursing documentation:  Allergies: Azithromycin, Canagliflozin, Codeine, Cortisone, Hydrocodone-acetaminophen, Lisinopril, Tramadol, and Celecoxib    Past medical history:   Past Medical History:   Diagnosis Date    [facility] [address]     Arthritis     hands    CAD (coronary artery disease)     stents    Diabetes mellitus ([facility] [address]     GERD (gastroesophageal reflux disease)     History of blood transfusion     R/T previous surgeries    Hyperlipidemia     Hypertension     Nausea & vomiting      Past surgical history:   Past Surgical History:   Procedure Laterality Date    BACK SURGERY      BREAST SURGERY      CARDIAC SURGERY      STENTS X3    CHOLECYSTECTOMY, LAPAROSCOPIC      EYE SURGERY      KNEE SURGERY      right       Home medications:   Discharge Medication List as of 1/6/2022  2:35 PM      CONTINUE these medications which have NOT CHANGED    Details   diclofenac sodium (VOLTAREN) 1 % GEL Apply 2 g topically 4 times daily as needed for Pain, Topical, 4 TIMES DAILY PRN, Historical Med      sitaGLIPtan-metformin (JANUMET)  MG per tablet Take 1 tablet by mouth 2 times daily (with meals)       metoprolol (LOPRESSOR) 25 MG tablet Take 25 mg by mouth 2 times daily Historical Med      nitroGLYCERIN (NITROSTAT) 0.4 MG SL tablet Place 1 tablet under the tongue every 5 minutes as needed for Chest pain., Disp-25 tablet, R-0      rosuvastatin (CRESTOR) 10 MG tablet Take 10 mg by mouth daily. aspirin 81 MG chewable tablet Take 81 mg by mouth daily. nateglinide (STARLIX) 120 MG tablet Take 120 mg by mouth 3 times daily (before meals)Historical Med      clopidogrel (PLAVIX) 75 MG tablet Take 75 mg by mouth daily. lansoprazole (PREVACID) 30 MG capsule Take 30 mg by mouth daily. Social history:  reports that she quit smoking about 29 years ago. She has a 15.00 pack-year smoking history. She has never used smokeless tobacco. She reports that she does not drink alcohol and does not use drugs. Family history:    Family History   Problem Relation Age of Onset    Diabetes Mother     Heart Disease Mother     Other Grandchild         VonWillebrand    Anesth Problems Neg Hx     Malig Hyperten Neg Hx     Hypotension Neg Hx     Malig Hypertherm Neg Hx     Pseudochol.  Deficiency Neg Hx     High Blood Pressure Neg Hx     High Cholesterol Neg Hx        Exam  ED Triage Vitals [01/06/22 0534]   BP Temp Temp Source Pulse Resp SpO2 Height Weight   (!) 163/75 97.9 °F (36.6 °C) Oral 74 18 99 % 5' 2.5\" (1.588 m) 200 lb (90.7 kg)     Nursing note and vitals reviewed. Constitutional: Well developed, well nourished. Non-toxic in appearance. HENT:      Head: Normocephalic and atraumatic. Ears: External ears normal.      Nose: Nose normal.     Mouth: Membrane mucosa moist and pink. Eyes: Anicteric sclera. No discharge. No visual field deficits as tested by confrontation in each eye individually. Neck: Supple. Trachea midline. Cardiovascular: RRR; no murmurs, rubs, or gallops. Pulmonary/Chest: Effort normal. No respiratory distress. CTAB. No stridor. No wheezes. No rales. Abdominal: Soft. No distension. Tender to palpation in epigastric abdomen, LUQ, and RUQ with mild guarding and no rebound. Musculoskeletal: Moves all extremities. No gross deformity. No pitting edema. Neurological: Alert and oriented to person, place, time, and situation. Face symmetric without droop or paralysis. No drift in the bilateral upper or lower extremities. Strength normal and equal in the bilateral upper and lower extremities. Normal sensation to light touch throughout the bilateral face, upper extremities, and lower extremities. Normal finger-nose bilaterally. Normal heel-shin bilaterally. Speech normal without aphasia or dysarthria. No neglect or gaze deviation. No truncal ataxia. Negative test of skew. Skin: Warm and dry. No rash. Psychiatric: Normal mood and affect. Behavior is normal.    Procedures  PROCEDURE:  ULTRASOUND-GUIDED CANNULATION OF PERIPHERAL VEIN  Yo Graf or their surrogate had an opportunity to ask questions, and the risks, benefits, and alternatives were discussed. The puncture site was prepped to maintain a clean field. Using ultrasound guidance, I accessed the right brachial external jugular vein with a 20-gauge catheter. The catheter was easily flushed with normal saline. There were no complications during the procedure and an acceptable blood specimen was obtained. EKG  The Ekg interpreted by me in the absence of a cardiologist shows. normal sinus rhythm and sinus arrhythmia with a rate of 65  Axis is   Normal  QTc is  normal  Intervals and Durations are unremarkable. No ST elevation or ST depression. Computer reads this as having ST elevation, but this is not actually present. Rather, there appears to be motion artifact causing this appearance. There are T-wave inversions in anterior and inferior leads, suggesting ischemia. T-wave inversions are seen previously on EKG from 7/31/2021, except for a new T-wave inversion in lead V3. Otherwise, there are no significant changes in comparison to previous. EKG #2  The Ekg interpreted by me in the absence of a cardiologist shows. normal sinus rhythm and sinus arrhythmia with a rate of 73  Axis is   Normal  QTc is  normal  Intervals and Durations are unremarkable. No ST elevation or ST depression. Computer reads this as having ST elevation, but this is not actually present. Rather, there appears to be motion artifact causing this appearance. There are T-wave inversions in anterior and inferior leads, suggesting ischemia. No significant change from prior EKG performed earlier. Radiology  CT ABDOMEN PELVIS W IV CONTRAST Additional Contrast? None   Final Result   Diverticulosis. CT HEAD WO CONTRAST   Final Result   No acute intracranial abnormality.       RECOMMENDATIONS:   Unavailable         XR CHEST PORTABLE   Final Result   Negative low lung volume study             Labs  Results for orders placed or performed during the hospital encounter of 01/06/22   Rapid influenza A/B antigens    Specimen: Nasopharyngeal   Result Value Ref Range    Rapid Influenza A Ag Negative Negative    Rapid Influenza B Ag Negative Negative   Culture, Urine    Specimen: Urine, clean catch   Result Value Ref Range    Organism Escherichia coli (A)     Urine Culture, Routine >100,000 CFU/ml        Susceptibility    Escherichia coli - BACTERIAL SUSCEPTIBILITY PANEL BY JAYMIE     ampicillin 4 Sensitive mcg/mL     ampicillin-sulbactam <=2 Sensitive mcg/mL     ceFAZolin* <=4 Sensitive mcg/mL      * NOTE: Cefazolin should only be used for uncomplicated UTI      for E.coli or Klebsiella pneumoniae.      cefepime <=0.12 Sensitive mcg/mL     cefTRIAXone <=0.25 Sensitive mcg/mL     ciprofloxacin <=0.25 Sensitive mcg/mL     ertapenem <=0.12 Sensitive mcg/mL     gentamicin <=1 Sensitive mcg/mL     levofloxacin <=0.12 Sensitive mcg/mL     nitrofurantoin <=16 Sensitive mcg/mL     piperacillin-tazobactam <=4 Sensitive mcg/mL     trimethoprim-sulfamethoxazole <=20 Sensitive mcg/mL   Comprehensive Metabolic Panel w/ Reflex to MG   Result Value Ref Range    Sodium 140 136 - 145 mmol/L    Potassium reflex Magnesium 5.3 (H) 3.5 - 5.1 mmol/L    Chloride 101 99 - 110 mmol/L    CO2 26 21 - 32 mmol/L    Anion Gap 13 3 - 16    Glucose 189 (H) 70 - 99 mg/dL    BUN 22 (H) 7 - 20 mg/dL    CREATININE 1.0 0.6 - 1.2 mg/dL    GFR Non- 53 (A) >60    GFR African American >60 >60    Calcium 9.4 8.3 - 10.6 mg/dL    Total Protein 7.0 6.4 - 8.2 g/dL    Albumin 4.0 3.4 - 5.0 g/dL    Albumin/Globulin Ratio 1.3 1.1 - 2.2    Total Bilirubin 0.4 0.0 - 1.0 mg/dL    Alkaline Phosphatase 53 40 - 129 U/L    ALT 12 10 - 40 U/L    AST 16 15 - 37 U/L   Lipase   Result Value Ref Range    Lipase 19.0 13.0 - 60.0 U/L   Urinalysis Reflex to Culture    Specimen: Urine, clean catch   Result Value Ref Range    Color, UA YELLOW Straw/Yellow    Clarity, UA CLOUDY (A) Clear    Glucose, Ur Negative Negative mg/dL    Bilirubin Urine Negative Negative    Ketones, Urine Negative Negative mg/dL    Specific Gravity, UA >1.030 1.005 - 1.030    Blood, Urine Negative Negative    pH, UA 7.0 5.0 - 8.0    Protein, UA Negative Negative mg/dL    Urobilinogen, Urine 1.0 <2.0 E.U./dL    Nitrite, Urine Negative Negative    Leukocyte Esterase, Urine MODERATE (A) Negative    Microscopic Examination YES     Urine Type NotGiven     Urine Reflex to Culture Yes    Troponin   Result Value Ref Range    Troponin <0.01 <0.01 ng/mL   Lactic acid, plasma   Result Value Ref Range    Lactic Acid 2.6 (H) 0.4 - 2.0 mmol/L   SPECIMEN REJECTION   Result Value Ref Range    Rejected Test cbcwd     Reason for Rejection see below    CBC Auto Differential   Result Value Ref Range    WBC 7.9 4.0 - 11.0 K/uL    RBC 3.46 (L) 4.00 - 5.20 M/uL    Hemoglobin 8.5 (L) 12.0 - 16.0 g/dL    Hematocrit 27.5 (L) 36.0 - 48.0 %    MCV 79.4 (L) 80.0 - 100.0 fL    MCH 24.5 (L) 26.0 - 34.0 pg    MCHC 30.8 (L) 31.0 - 36.0 g/dL    RDW 19.1 (H) 12.4 - 15.4 %    Platelets 397 700 - 276 K/uL    MPV 8.1 5.0 - 10.5 fL    Neutrophils % 75.1 %    Lymphocytes % 18.0 %    Monocytes % 5.3 %    Eosinophils % 1.2 %    Basophils % 0.4 %    Neutrophils Absolute 5.9 1.7 - 7.7 K/uL    Lymphocytes Absolute 1.4 1.0 - 5.1 K/uL    Monocytes Absolute 0.4 0.0 - 1.3 K/uL    Eosinophils Absolute 0.1 0.0 - 0.6 K/uL    Basophils Absolute 0.0 0.0 - 0.2 K/uL   Blood occult stool #1   Result Value Ref Range    Occult Blood Diagnostic Result: Negative  Normal range: Negative      Microscopic Urinalysis   Result Value Ref Range    Bacteria, UA 4+ (A) None Seen /HPF    Hyaline Casts, UA 1 0 - 8 /LPF    WBC, UA 32 (H) 0 - 5 /HPF    RBC, UA 2 0 - 4 /HPF    Epithelial Cells, UA 1 0 - 5 /HPF       Screenings  NIH Stroke Scale  Interval: Baseline  Level of Consciousness (1a. ): Alert  LOC Questions (1b. ):  Answers both correctly  LOC Commands (1c. ): Performs both tasks correctly  Best Gaze (2. ): Normal  Visual (3. ): No visual loss  Facial Palsy (4. ): Normal symmetrical movement  Motor Arm, Left (5a. ): No drift  Motor Arm, Right (5b. ): No drift  Motor Leg, Left (6a. ): No drift  Motor Leg, Right (6b. ): No drift  Limb Ataxia (7. ): Absent  Sensory (8. ): Normal  Best Language (9. ): No aphasia  Dysarthria (10. ): Normal  Extinction and Inattention (11): No abnormality  Total: 0      MDM and ED Course  Patient initial EKG was read by the computer as \"acute MI\". However, on reviewing the EKG myself, disagreed and did not think this was an acute ST elevation MI. Reviewing previous EKGs, it appears similar. I believe the changes read as \"acute MI\" are secondary to motion artifact. I repeated the EKG and it shows the same findings with the same read by the computer. I contacted the on-call interventional cardiologist, Dr. Jose Rafael Sheth. We thoroughly discussed the case and he reviewed the EKG. He agreed with me that this does not represent MI. Patient improved with treatment of her symptoms. Her urine appears infected and I initiated treatment with Cefepime IV. She does not meet criteria for sepsis as she is  Afebrile, without leukocytosis and without tachycardia or tachypnea. She was mildly anemic, but hemoccult was negative. This is only slightly less than her chronic Hgb/Hct level, so I instructed her to have this further evaluated by her PCP. Her lactic acid was mildly elevated. I suspect this is due to dehydration. She received IV fluids in the ED and felt better. She is safe for discharge at this time. I estimate there is LOW risk for ACUTE APPENDICITIS, BOWEL OBSTRUCTION, CHOLECYSTITIS, DIVERTICULITIS, INCARCERATED HERNIA, PANCREATITIS, PELVIC INFLAMMATORY DISEASE, PERFORATED BOWEL, PERFORATED OR BLEEDING ULCER, ECTOPIC PREGNANCY, TUBO-OVARIAN ABSCESS, thus I consider the discharge disposition reasonable. Kenny Nieves and I have discussed the diagnosis and risks, and we agree with discharging home to follow-up with their primary doctor. We also discussed returning to the Emergency Department immediately if new or worsening symptoms occur.  We have discussed the symptoms which are most concerning (e.g., bloody stool, fever, changing or worsening pain, intractable vomiting) that necessitate immediate return. Final Impression  1. Urinary tract infection without hematuria, site unspecified    2. Generalized weakness    3. Lightheadedness        Blood pressure (!) 144/62, pulse 68, temperature 97.9 °F (36.6 °C), temperature source Oral, resp. rate 19, height 5' 2.5\" (1.588 m), weight 200 lb (90.7 kg), SpO2 98 %, not currently breastfeeding. Disposition:  Discharge      Patient Referrals:  Ramos Hanks  [address] [address]  [phone]    In 4 days  for re-evaluation    [facility] Emergency Department  [address]  [phone]    As needed, If symptoms worsen or new symptoms develop      This chart was generated using the [address] [place] [address] dictation system. I created this record but it may contain dictation errors given the limitations of this technology.        Rahul Munguia MD  01/15/22 9781

## 2022-01-08 LAB
ORGANISM: ABNORMAL
URINE CULTURE, ROUTINE: ABNORMAL

## 2022-02-26 ENCOUNTER — HOSPITAL ENCOUNTER (EMERGENCY)
Age: 87
Discharge: HOME | End: 2022-02-26
Payer: MEDICARE

## 2022-02-26 ENCOUNTER — APPOINTMENT (OUTPATIENT)
Dept: GENERAL RADIOLOGY | Age: 87
End: 2022-02-26
Payer: MEDICARE

## 2022-02-26 VITALS
DIASTOLIC BLOOD PRESSURE: 80 MMHG | HEART RATE: 86 BPM | RESPIRATION RATE: 18 BRPM | SYSTOLIC BLOOD PRESSURE: 134 MMHG | OXYGEN SATURATION: 100 % | TEMPERATURE: 97.7 F

## 2022-02-26 DIAGNOSIS — M25.562 ACUTE PAIN OF LEFT KNEE: Primary | ICD-10-CM

## 2022-02-26 PROCEDURE — 99283 EMERGENCY DEPT VISIT LOW MDM: CPT

## 2022-02-26 PROCEDURE — 73564 X-RAY EXAM KNEE 4 OR MORE: CPT

## 2022-02-26 PROCEDURE — 6370000000 HC RX 637 (ALT 250 FOR IP): Performed by: PHYSICIAN ASSISTANT

## 2022-02-26 RX ORDER — ACETAMINOPHEN 500 MG
1000 TABLET ORAL ONCE
Status: COMPLETED | OUTPATIENT
Start: 2022-02-26 | End: 2022-02-26

## 2022-02-26 RX ORDER — LIDOCAINE 50 MG/G
1 PATCH TOPICAL DAILY
Qty: 30 PATCH | Refills: 0 | Status: SHIPPED | OUTPATIENT
Start: 2022-02-26

## 2022-02-26 RX ORDER — LIDOCAINE 4 G/G
1 PATCH TOPICAL DAILY
Status: DISCONTINUED | OUTPATIENT
Start: 2022-02-26 | End: 2022-02-26 | Stop reason: HOSPADM

## 2022-02-26 RX ADMIN — ACETAMINOPHEN 1000 MG: 500 TABLET ORAL at 11:10

## 2022-02-26 ASSESSMENT — PAIN DESCRIPTION - ORIENTATION: ORIENTATION: LEFT

## 2022-02-26 ASSESSMENT — PAIN DESCRIPTION - PAIN TYPE: TYPE: ACUTE PAIN

## 2022-02-26 ASSESSMENT — PAIN SCALES - GENERAL
PAINLEVEL_OUTOF10: 9
PAINLEVEL_OUTOF10: 10

## 2022-02-26 ASSESSMENT — PAIN DESCRIPTION - LOCATION: LOCATION: KNEE

## 2022-02-26 ASSESSMENT — PAIN - FUNCTIONAL ASSESSMENT: PAIN_FUNCTIONAL_ASSESSMENT: 0-10

## 2022-02-26 NOTE — ED PROVIDER NOTES
[address] [facility]        Pt Name: Derrek Darby  MRN: [id number]  [place] 1935  Date of evaluation: 2/26/2022  Provider: Dany Arboleda, LOCO  PCP: Kriss Orellana  Note Started: 11:12 AM EST       JASPREET. I have evaluated this patient. My supervising physician was available for consultation. CHIEF COMPLAINT       Chief Complaint   Patient presents with    Fall     fell yesterday morning, pain located left side pain, and left knee pain, denies hitting head, no blood thinners. HISTORY OF PRESENT ILLNESS   (Location, Timing/Onset, Context/Setting, Quality, Duration, Modifying Factors, Severity, Associated Signs and Symptoms)  Note limiting factors. Chief Complaint: Left knee pain     Derrek Darby is a 80 y.o. female who presents to the emergency department with left knee pain. Patient fell yesterday morning a little over 24 hours before presenting to the emergency department. Her granddaughter drove her here. Granddaughter states that she was there yesterday and she was using an umbrella as a crutch and able to ambulate but pain became worse today. Patient is unsure how she fell but denies use of anticoagulants, loss conscious or any other injury from the fall. Rates the pain a 9 out of 10. Has not taken anything for the pain at home. Denies wounds, history of injuries or surgeries in the past to the left knee or any other injuries. Nursing Notes were all reviewed and agreed with or any disagreements were addressed in the HPI. REVIEW OF SYSTEMS    (2-9 systems for level 4, 10 or more for level 5)     Review of Systems    Positives and Pertinent negatives as per HPI. Except as noted above in the ROS, all other systems were reviewed and negative.        PAST MEDICAL HISTORY     Past Medical History:   Diagnosis Date    Acute [facility] ([facility] [address]     Arthritis     hands    CAD (coronary artery disease)     stents    Diabetes mellitus ([facility] [address]     GERD (gastroesophageal reflux disease)     History of blood transfusion     R/T previous surgeries    Hyperlipidemia     Hypertension     Nausea & vomiting          SURGICAL HISTORY     Past Surgical History:   Procedure Laterality Date    BACK SURGERY      BREAST SURGERY      CARDIAC SURGERY      STENTS X3    CHOLECYSTECTOMY, LAPAROSCOPIC      EYE SURGERY      KNEE SURGERY      right         CURRENTMEDICATIONS       Discharge Medication List as of 2/26/2022 12:40 PM      CONTINUE these medications which have NOT CHANGED    Details   ondansetron (ZOFRAN ODT) 8 MG TBDP disintegrating tablet Take 1 tablet by mouth every 8 hours as needed for Nausea, Disp-10 tablet, R-0Print      diclofenac sodium (VOLTAREN) 1 % GEL Apply 2 g topically 4 times daily as needed for Pain, Topical, 4 TIMES DAILY PRN, Historical Med      nateglinide (STARLIX) 120 MG tablet Take 120 mg by mouth 3 times daily (before meals)Historical Med      sitaGLIPtan-metformin (JANUMET)  MG per tablet Take 1 tablet by mouth 2 times daily (with meals)       metoprolol (LOPRESSOR) 25 MG tablet Take 25 mg by mouth 2 times daily Historical Med      nitroGLYCERIN (NITROSTAT) 0.4 MG SL tablet Place 1 tablet under the tongue every 5 minutes as needed for Chest pain., Disp-25 tablet, R-0      rosuvastatin (CRESTOR) 10 MG tablet Take 10 mg by mouth daily. clopidogrel (PLAVIX) 75 MG tablet Take 75 mg by mouth daily. lansoprazole (PREVACID) 30 MG capsule Take 30 mg by mouth daily. aspirin 81 MG chewable tablet Take 81 mg by mouth daily.                ALLERGIES     Azithromycin, Canagliflozin, Codeine, Cortisone, Hydrocodone-acetaminophen, Lisinopril, Tramadol, and Celecoxib    FAMILYHISTORY       Family History   Problem Relation Age of Onset    Diabetes Mother     Heart Disease Mother     Other Grandchild         VonWillebrand    Anesth Problems Neg Hx     Malig Hyperten Neg Hx     Hypotension Neg Hx     Malig Hypertherm Neg Hx     Pseudochol. Deficiency Neg Hx     High Blood Pressure Neg Hx     High Cholesterol Neg Hx           SOCIAL HISTORY       Social History     Tobacco Use    Smoking status: Former Smoker     Packs/day: 1.00     Years: 15.00     Pack years: 15.00     Quit date: 1993     Years since quittin.1    Smokeless tobacco: Never Used    Tobacco comment: quit 17 years ago   Substance Use Topics    Alcohol use: No     Comment: QUIR 16 YRS AGO.  Drug use: No       SCREENINGS    Edson Coma Scale  Eye Opening: Spontaneous  Best Verbal Response: Oriented  Best Motor Response: Obeys commands  Edson Coma Scale Score: 15        PHYSICAL EXAM    (up to 7 for level 4, 8 or more for level 5)     ED Triage Vitals 22 1052   BP Temp Temp src Pulse Resp SpO2 Height Weight   134/80 97.7 °F (36.5 °C) -- 86 (!) 98 100 % -- --       Physical Exam  Vitals and nursing note reviewed. Constitutional:       Appearance: She is well-developed. She is not diaphoretic. HENT:      Head: Atraumatic. Nose: Nose normal.   Eyes:      General:         Right eye: No discharge. Left eye: No discharge. Cardiovascular:      Pulses: Normal pulses. Comments: 1+ dorsal pedal pulse in left foot  Musculoskeletal:         General: Tenderness present. Cervical back: Normal range of motion. Comments: Generalized tenderness around the left knee with a small area of ecchymosis measuring approximately 2 x 2 cm over the medial aspect the left knee. No deformity or break in the skin. No joint warmth, erythema or rash. Decreased range of motion with flexion secondary to pain. No pain with logrolling of the leg. Full range of motion of flexion extension of left ankle. Sensation light touch distally intact. Skin:     General: Skin is warm and dry. Findings: No erythema or rash. Neurological:      Mental Status: She is alert and oriented to person, place, and time.       Cranial Nerves: No cranial nerve deficit. Psychiatric:         Behavior: Behavior normal.         DIAGNOSTIC RESULTS   LABS:    Labs Reviewed - No data to display    When ordered only abnormal lab results are displayed. All other labs were within normal range or not returned as of this dictation. EKG: When ordered, EKG's are interpreted by the Emergency Department Physician in the absence of a cardiologist.  Please see their note for interpretation of EKG. RADIOLOGY:   Non-plain film images such as CT, Ultrasound and MRI are read by the radiologist. Plain radiographic images are visualized and preliminarily interpreted by the ED Provider with the below findings:        Interpretation per the Radiologist below, if available at the time of this note:    XR KNEE LEFT (MIN 4 VIEWS)   Final Result   Tricompartmental osteoarthritis, severe medially. No acute fracture noted      Vascular calcifications      RECOMMENDATION:               PROCEDURES   Unless otherwise noted below, none     Procedures    CRITICAL CARE TIME       CONSULTS:  None      EMERGENCY DEPARTMENT COURSE and DIFFERENTIAL DIAGNOSIS/MDM:   Vitals:    Vitals:    02/26/22 1052 02/26/22 1112   BP: 134/80    Pulse: 86    Resp: (!) 98 18   Temp: 97.7 °F (36.5 °C)    SpO2: 100%        Patient was given the following medications:  Medications   acetaminophen (TYLENOL) tablet 1,000 mg (1,000 mg Oral Given 2/26/22 1110)           Patient presented with left knee pain. Sin Larsen yesterday morning. She has been able to ambulate on this with a mild limp. Her daughter is actually bringing a walker over to her later today. X-ray imaging reveals tricompartmental osteoarthritis. There is no acute fracture. She is still moving her knee very well. Low suspicion for fracture given she has been able to bear weight with a mild limp and move her knee well. She will follow-up with orthopedics and return here for any worsening symptoms or problems home.  Low special for acute fracture, septic arthritis, cellulitis or other emergent etiology. She is distally neurovascular intact. Educated symptomatic treatment at home. FINAL IMPRESSION      1.  Acute pain of left knee          DISPOSITION/PLAN   DISPOSITION Decision To Discharge 02/26/2022 12:38:23 PM      PATIENT REFERRED TO:  Navneet Smith MD  [address] [address]  [place] [address]  [phone]    Schedule an appointment as soon as possible for a visit in 3 days  For re-check    [facility]  [address] [place] [address]  [phone]    As needed      DISCHARGE MEDICATIONS:  Discharge Medication List as of 2/26/2022 12:40 PM      START taking these medications    Details   lidocaine (LIDODERM) 5 % Place 1 patch onto the skin daily 12 hours on, 12 hours off., Disp-30 patch, R-0Normal             DISCONTINUED MEDICATIONS:  Discharge Medication List as of 2/26/2022 12:40 PM                 (Please note that portions of this note were completed with a voice recognition program.  Efforts were made to edit the dictations but occasionally words are mis-transcribed.)    Dave Guidry PA-C (electronically signed)           Dave Guidry PA-C  02/26/22 [id number]

## 2023-02-10 ENCOUNTER — TELEPHONE (OUTPATIENT)
Dept: ORTHOPEDIC SURGERY | Age: 88
End: 2023-02-10

## 2023-02-10 NOTE — TELEPHONE ENCOUNTER
Referral received for patients chronic left knee pain. Attempt to call mobile phone. No Answer. No Identifiers. LVM to call back to schedule. No further details. Attempt to call home phone. No answer. No identifiers. Upon leaving message the, [facility] answered. Patient will call back when her daughter can tell her when she can take the patient. Patient notes having a TKR on right knee. Prefers the  location. Daughter works on that part of town. Can schedule with any provider who works best for their convenience.

## 2023-02-21 ENCOUNTER — OFFICE VISIT (OUTPATIENT)
Dept: ORTHOPEDIC SURGERY | Age: 88
End: 2023-02-21

## 2023-02-21 VITALS — WEIGHT: 200 LBS | HEIGHT: 63 IN | BODY MASS INDEX: 35.44 KG/M2

## 2023-02-21 DIAGNOSIS — M25.562 ACUTE PAIN OF LEFT KNEE: ICD-10-CM

## 2023-02-21 DIAGNOSIS — M17.12 PRIMARY OSTEOARTHRITIS OF LEFT KNEE: Primary | ICD-10-CM

## 2023-02-21 RX ORDER — TRIAMCINOLONE ACETONIDE 40 MG/ML
40 INJECTION, SUSPENSION INTRA-ARTICULAR; INTRAMUSCULAR ONCE
Status: COMPLETED | OUTPATIENT
Start: 2023-02-21 | End: 2023-02-21

## 2023-02-21 RX ORDER — LIDOCAINE HYDROCHLORIDE 10 MG/ML
40 INJECTION, SOLUTION INFILTRATION; PERINEURAL ONCE
Status: COMPLETED | OUTPATIENT
Start: 2023-02-21 | End: 2023-02-21

## 2023-02-21 RX ADMIN — LIDOCAINE HYDROCHLORIDE 40 MG: 10 INJECTION, SOLUTION INFILTRATION; PERINEURAL at 15:41

## 2023-02-21 RX ADMIN — TRIAMCINOLONE ACETONIDE 40 MG: 40 INJECTION, SUSPENSION INTRA-ARTICULAR; INTRAMUSCULAR at 15:42

## 2023-02-21 NOTE — PROGRESS NOTES
CHIEF COMPLAINT: Left knee pain/ osteoarthritis. HISTORY:  Ms. Antonia Rubin 80 y.o.  female presents today for the first visit for evaluation of left knee pain which started to worsen years ago but more significantly 2 months ago. Denies injury or trauma. She is complaining of achy pain. Pain is increase with standing and walking and decrease with rest.  She rates her pain a 0/10 VAS at rest and increases to a 5/10 VAS with walking. She uses a walker. Pain is sharp early in the morning with first few steps, dull achy pain by the end of the day. Alleviating factors: rest. No radiation and no numbness and tingling sensation. No other complaint. No h/o trauma or gout. She has a history of right total knee replacement and is not wanting any further surgery at this time. Past Medical History:   Diagnosis Date    Acute MI ([facility] [address]     Arthritis     hands    CAD (coronary artery disease)     stents    Diabetes mellitus ([facility] [address]     GERD (gastroesophageal reflux disease)     History of blood transfusion     R/T previous surgeries    Hyperlipidemia     Hypertension     Nausea & vomiting        Past Surgical History:   Procedure Laterality Date    BACK SURGERY      BREAST SURGERY      CARDIAC SURGERY      STENTS X3    CHOLECYSTECTOMY, LAPAROSCOPIC      EYE SURGERY      KNEE SURGERY      right       Social History     Socioeconomic History    Marital status:      Spouse name: Not on file    Number of children: Not on file    Years of education: Not on file    Highest education level: Not on file   Occupational History    Not on file   Tobacco Use    Smoking status: Former     Packs/day: 1.00     Years: 15.00     Pack years: 15.00     Types: Cigarettes     Quit date: 1993     Years since quittin.1    Smokeless tobacco: Never    Tobacco comments:     quit 17 years ago   Substance and Sexual Activity    Alcohol use: No     Comment: QUIR 16 YRS AGO.     Drug use: No    Sexual activity: Not Currently   Other Topics Concern    Not on file   Social History Narrative    Not on file     Social Determinants of Health     Financial Resource Strain: Not on file   Food Insecurity: Not on file   Transportation Needs: Not on file   Physical Activity: Not on file   Stress: Not on file   Social Connections: Not on file   Intimate Partner Violence: Not on file   Housing Stability: Not on file       Family History   Problem Relation Age of Onset    Diabetes Mother     Heart Disease Mother     Other Grandchild         VonWillebrand    Anesth Problems Neg Hx     Malig Hyperten Neg Hx     Hypotension Neg Hx     Malig Hypertherm Neg Hx     Pseudochol. Deficiency Neg Hx     High Blood Pressure Neg Hx     High Cholesterol Neg Hx        Current Outpatient Medications on File Prior to Visit   Medication Sig Dispense Refill    lidocaine (LIDODERM) 5 % Place 1 patch onto the skin daily 12 hours on, 12 hours off. 30 patch 0    ondansetron (ZOFRAN ODT) 8 MG TBDP disintegrating tablet Take 1 tablet by mouth every 8 hours as needed for Nausea 10 tablet 0    diclofenac sodium (VOLTAREN) 1 % GEL Apply 2 g topically 4 times daily as needed for Pain      nateglinide (STARLIX) 120 MG tablet Take 120 mg by mouth 3 times daily (before meals)      sitaGLIPtan-metformin (JANUMET)  MG per tablet Take 1 tablet by mouth 2 times daily (with meals)       metoprolol (LOPRESSOR) 25 MG tablet Take 25 mg by mouth 2 times daily       nitroGLYCERIN (NITROSTAT) 0.4 MG SL tablet Place 1 tablet under the tongue every 5 minutes as needed for Chest pain. 25 tablet 0    rosuvastatin (CRESTOR) 10 MG tablet Take 10 mg by mouth daily. clopidogrel (PLAVIX) 75 MG tablet Take 75 mg by mouth daily. lansoprazole (PREVACID) 30 MG capsule Take 30 mg by mouth daily. aspirin 81 MG chewable tablet Take 81 mg by mouth daily. No current facility-administered medications on file prior to visit.        Pertinent items are noted in HPI  Review of systems reviewed from Patient History Form and available in the patient's chart under the Media tab.        PHYSICAL EXAMINATION:  Ms. Rowland is a very pleasant 87 y.o.  female who presents today in no acute distress, awake, alert, and oriented.  She is well dressed, nourished and  groomed.  Patient with normal affect.  Height is  5' 2.5\" (1.588 m), weight is 200 lb (90.7 kg), Body mass index is 36 kg/m².  Resting respiratory rate is 16.     Examination of the gait, showed that the patient walks heel-toe with a non-antalgic gait and no limp.  Examination of both knees showing full ROM, left moderate crepitus, tenderness on medial joint line, stable to varus and valgus stress.  She has intact sensation and good pedal pulses. She has good strength in 2 planes, and has mild tenderness on deep palpation over the medial joint line. Knee reflex 1+ bilaterally.     IMAGING:  Xray 3 views of the left knee was obtained today in the office and reviewed.  These demonstrate severe degenerative changes with narrowing of the joint space in the medial joint space compartment, subchondral sclerosis, and marginal osteophytosis.    There is right total knee hardware that is in place showing no signs of failure    IMPRESSION: Left knee DJD.    PLAN: I discussed with the patient the treatment options including both surgical and non-surgical treatment.  We recommended Quad exercises and stretching of the calf and hamstrings which was taught to the patient today.  I believe she will benefit from cortisone injection left knee, and she agreed to have.     PROCEDURE:    With the patient's permission, and under sterile condition, the left knee was prepared and draped with alcohol and injected with a mixture of 1 mL Kenalog 40mg and 4 mL of 1% lidocaine through the medial parapatellar port area.  The patient tolerated the procedure well.   A band-aid was applied and the patient was advised to ice the knee for 15-20 minutes to relieve any injection  site related pain. She reported a good improvement immediatly after the injection. F/u in 3-4 months, PT if needed. She understands that this may need surgery if the pain did not to resolve.        Henderson Krabbe, MD

## 2023-02-25 PROBLEM — M17.12 PRIMARY OSTEOARTHRITIS OF LEFT KNEE: Status: ACTIVE | Noted: 2023-02-25

## 2023-03-17 ENCOUNTER — TELEPHONE (OUTPATIENT)
Dept: CARDIOLOGY CLINIC | Age: 88
End: 2023-03-17

## 2023-03-17 NOTE — TELEPHONE ENCOUNTER
Daughter states pt is having swelling in legs and sob with walking. [facility] request her to send message. Please call to adivse.

## 2023-03-17 NOTE — TELEPHONE ENCOUNTER
Daughter and son in law also see Julio Cardenasisaías, daughter, Suly Bedolla, saw him and was advised to call to get her mother in to be seen for sob and swelling. Scheduled 3/22/23 @ 9:45 with Celio Chilel.

## 2023-03-20 NOTE — PROGRESS NOTES
tablet by mouth 2 times daily (with meals)       metoprolol (LOPRESSOR) 25 MG tablet Take 25 mg by mouth 2 times daily       nitroGLYCERIN (NITROSTAT) 0.4 MG SL tablet Place 1 tablet under the tongue every 5 minutes as needed for Chest pain. 25 tablet 0    rosuvastatin (CRESTOR) 10 MG tablet Take 10 mg by mouth daily. clopidogrel (PLAVIX) 75 MG tablet Take 75 mg by mouth daily. lansoprazole (PREVACID) 30 MG capsule Take 30 mg by mouth daily. aspirin 81 MG chewable tablet Take 81 mg by mouth daily. ondansetron (ZOFRAN ODT) 8 MG TBDP disintegrating tablet Take 1 tablet by mouth every 8 hours as needed for Nausea (Patient not taking: Reported on 3/22/2023) 10 tablet 0    diclofenac sodium (VOLTAREN) 1 % GEL Apply 2 g topically 4 times daily as needed for Pain (Patient not taking: Reported on 3/22/2023)      nateglinide (STARLIX) 120 MG tablet Take 120 mg by mouth 3 times daily (before meals) (Patient not taking: Reported on 3/22/2023)       No current facility-administered medications for this visit. Allergies:  Azithromycin, Canagliflozin, Codeine, Hydrocodone-acetaminophen, Lisinopril, Tramadol, and Celecoxib     Review of Systems:   10 point review of systems is negative except as noted in the HPI    Physical Examination:    Vitals:    03/22/23 0950   BP: 120/80   Pulse: 74   SpO2: 97%          Constitutional and General Appearance: NAD, appears younger than her stated age. Respiratory:  Normal excursion and expansion without use of accessory muscles  Resp Auscultation: Normal breath sounds without dullness  Cardiovascular: The apical impulses not displaced  Heart tones are crisp and normal  Cervical veins are not engorged. The carotid upstroke is normal in amplitude and contour without delay or bruit. Normal S1S2, No S3, No Murmur.   Peripheral pulses are symmetrical and full  There is no clubbing, cyanosis of the extremities  Trace RLE edema, 1+ LLE edema  Pedal Pulses: 2+ and equal

## 2023-03-20 NOTE — PATIENT INSTRUCTIONS
Elevate your legs during the day  Keep a low sodium diet  We will check some labs to see if you are holding on to fluid  Schedule an echocardiogram to look at your valves and chambers of the heart  Return to office in about a month.

## 2023-03-22 ENCOUNTER — OFFICE VISIT (OUTPATIENT)
Dept: CARDIOLOGY CLINIC | Age: 88
End: 2023-03-22
Payer: MEDICARE

## 2023-03-22 ENCOUNTER — HOSPITAL ENCOUNTER (OUTPATIENT)
Age: 88
Discharge: HOME | End: 2023-03-22
Payer: MEDICARE

## 2023-03-22 VITALS
DIASTOLIC BLOOD PRESSURE: 80 MMHG | OXYGEN SATURATION: 97 % | BODY MASS INDEX: 35.51 KG/M2 | HEART RATE: 74 BPM | SYSTOLIC BLOOD PRESSURE: 120 MMHG | HEIGHT: 64 IN | WEIGHT: 208 LBS

## 2023-03-22 DIAGNOSIS — I25.10 CORONARY ARTERY DISEASE DUE TO LIPID RICH PLAQUE: Chronic | ICD-10-CM

## 2023-03-22 DIAGNOSIS — R06.02 SOB (SHORTNESS OF BREATH): ICD-10-CM

## 2023-03-22 DIAGNOSIS — Z95.1 S/P CABG (CORONARY ARTERY BYPASS GRAFT): Primary | Chronic | ICD-10-CM

## 2023-03-22 DIAGNOSIS — E78.00 PURE HYPERCHOLESTEROLEMIA: Chronic | ICD-10-CM

## 2023-03-22 DIAGNOSIS — R60.9 SWELLING: ICD-10-CM

## 2023-03-22 DIAGNOSIS — I10 PRIMARY HYPERTENSION: Primary | ICD-10-CM

## 2023-03-22 DIAGNOSIS — I10 PRIMARY HYPERTENSION: Chronic | ICD-10-CM

## 2023-03-22 DIAGNOSIS — I25.83 CORONARY ARTERY DISEASE DUE TO LIPID RICH PLAQUE: Chronic | ICD-10-CM

## 2023-03-22 LAB
ALBUMIN SERPL-MCNC: 3.7 G/DL (ref 3.4–5)
ALP SERPL-CCNC: 63 U/L (ref 40–129)
ALT SERPL-CCNC: 10 U/L (ref 10–40)
ANION GAP SERPL CALCULATED.3IONS-SCNC: 13 MMOL/L (ref 3–16)
AST SERPL-CCNC: 13 U/L (ref 15–37)
BILIRUB DIRECT SERPL-MCNC: <0.2 MG/DL (ref 0–0.3)
BILIRUB INDIRECT SERPL-MCNC: ABNORMAL MG/DL (ref 0–1)
BILIRUB SERPL-MCNC: 0.4 MG/DL (ref 0–1)
BUN SERPL-MCNC: 18 MG/DL (ref 7–20)
CALCIUM SERPL-MCNC: 8.8 MG/DL (ref 8.3–10.6)
CHLORIDE SERPL-SCNC: 105 MMOL/L (ref 99–110)
CHOLEST SERPL-MCNC: 106 MG/DL (ref 0–199)
CO2 SERPL-SCNC: 24 MMOL/L (ref 21–32)
CREAT SERPL-MCNC: 1.5 MG/DL (ref 0.6–1.2)
GFR SERPLBLD CREATININE-BSD FMLA CKD-EPI: 33 ML/MIN/{1.73_M2}
GLUCOSE SERPL-MCNC: 233 MG/DL (ref 70–99)
HDLC SERPL-MCNC: 43 MG/DL (ref 40–60)
LDL CHOLESTEROL CALCULATED: 49 MG/DL
NT-PROBNP SERPL-MCNC: 799 PG/ML (ref 0–449)
POTASSIUM SERPL-SCNC: 5.6 MMOL/L (ref 3.5–5.1)
PROT SERPL-MCNC: 6.3 G/DL (ref 6.4–8.2)
SODIUM SERPL-SCNC: 142 MMOL/L (ref 136–145)
TRIGL SERPL-MCNC: 72 MG/DL (ref 0–150)
VLDLC SERPL CALC-MCNC: 14 MG/DL

## 2023-03-22 PROCEDURE — G8417 CALC BMI ABV UP PARAM F/U: HCPCS | Performed by: INTERNAL MEDICINE

## 2023-03-22 PROCEDURE — 36415 COLL VENOUS BLD VENIPUNCTURE: CPT

## 2023-03-22 PROCEDURE — G8427 DOCREV CUR MEDS BY ELIG CLIN: HCPCS | Performed by: INTERNAL MEDICINE

## 2023-03-22 PROCEDURE — 80048 BASIC METABOLIC PNL TOTAL CA: CPT

## 2023-03-22 PROCEDURE — 1090F PRES/ABSN URINE INCON ASSESS: CPT | Performed by: INTERNAL MEDICINE

## 2023-03-22 PROCEDURE — 83880 ASSAY OF NATRIURETIC PEPTIDE: CPT

## 2023-03-22 PROCEDURE — 80061 LIPID PANEL: CPT

## 2023-03-22 PROCEDURE — 93000 ELECTROCARDIOGRAM COMPLETE: CPT | Performed by: INTERNAL MEDICINE

## 2023-03-22 PROCEDURE — 80076 HEPATIC FUNCTION PANEL: CPT

## 2023-03-22 PROCEDURE — 99203 OFFICE O/P NEW LOW 30 MIN: CPT | Performed by: INTERNAL MEDICINE

## 2023-03-22 PROCEDURE — G8484 FLU IMMUNIZE NO ADMIN: HCPCS | Performed by: INTERNAL MEDICINE

## 2023-03-22 RX ORDER — HYDROCHLOROTHIAZIDE 25 MG/1
25 TABLET ORAL DAILY PRN
Qty: 30 TABLET | Refills: 5 | Status: SHIPPED | OUTPATIENT
Start: 2023-03-22

## 2023-03-22 NOTE — RESULT ENCOUNTER NOTE
Sudarshan Hernandez reviewed today's labs. Repeat labs thurs/fri and do not start diuretic at this time. May need to refer to nephrology as well. Will look at repeat labs. Spoke with pt daughter, Raciel Jo and she v/u on the plan.

## 2023-03-23 ENCOUNTER — HOSPITAL ENCOUNTER (OUTPATIENT)
Age: 88
Discharge: HOME | End: 2023-03-23
Payer: MEDICARE

## 2023-03-23 DIAGNOSIS — I10 PRIMARY HYPERTENSION: ICD-10-CM

## 2023-03-23 LAB
ANION GAP SERPL CALCULATED.3IONS-SCNC: 12 MMOL/L (ref 3–16)
BUN SERPL-MCNC: 23 MG/DL (ref 7–20)
CALCIUM SERPL-MCNC: 9 MG/DL (ref 8.3–10.6)
CHLORIDE SERPL-SCNC: 104 MMOL/L (ref 99–110)
CO2 SERPL-SCNC: 25 MMOL/L (ref 21–32)
CREAT SERPL-MCNC: 1.4 MG/DL (ref 0.6–1.2)
GFR SERPLBLD CREATININE-BSD FMLA CKD-EPI: 36 ML/MIN/{1.73_M2}
GLUCOSE SERPL-MCNC: 223 MG/DL (ref 70–99)
POTASSIUM SERPL-SCNC: 5.2 MMOL/L (ref 3.5–5.1)
SODIUM SERPL-SCNC: 141 MMOL/L (ref 136–145)

## 2023-03-23 PROCEDURE — 36415 COLL VENOUS BLD VENIPUNCTURE: CPT

## 2023-03-23 PROCEDURE — 80048 BASIC METABOLIC PNL TOTAL CA: CPT

## 2023-03-30 ENCOUNTER — APPOINTMENT (OUTPATIENT)
Dept: CT IMAGING | Age: 88
DRG: 690 | End: 2023-03-30
Payer: MEDICARE

## 2023-03-30 ENCOUNTER — HOSPITAL ENCOUNTER (INPATIENT)
Age: 88
LOS: 6 days | Discharge: HOME HEALTH | DRG: 690 | End: 2023-04-05
Attending: EMERGENCY MEDICINE | Admitting: INTERNAL MEDICINE
Payer: MEDICARE

## 2023-03-30 ENCOUNTER — APPOINTMENT (OUTPATIENT)
Dept: GENERAL RADIOLOGY | Age: 88
DRG: 690 | End: 2023-03-30
Payer: MEDICARE

## 2023-03-30 DIAGNOSIS — E83.42 HYPOMAGNESEMIA: ICD-10-CM

## 2023-03-30 DIAGNOSIS — N30.00 ACUTE CYSTITIS WITHOUT HEMATURIA: Primary | ICD-10-CM

## 2023-03-30 DIAGNOSIS — R42 LIGHTHEADEDNESS: ICD-10-CM

## 2023-03-30 PROBLEM — N39.0 UTI (URINARY TRACT INFECTION): Status: ACTIVE | Noted: 2023-03-30

## 2023-03-30 LAB
25(OH)D3 SERPL-MCNC: 19 NG/ML
ALBUMIN SERPL-MCNC: 3.9 G/DL (ref 3.4–5)
ALBUMIN/GLOB SERPL: 1.4 {RATIO} (ref 1.1–2.2)
ALP SERPL-CCNC: 63 U/L (ref 40–129)
ALT SERPL-CCNC: 9 U/L (ref 10–40)
ANION GAP SERPL CALCULATED.3IONS-SCNC: 11 MMOL/L (ref 3–16)
AST SERPL-CCNC: 10 U/L (ref 15–37)
BACTERIA URNS QL MICRO: ABNORMAL /HPF
BASOPHILS # BLD: 0 K/UL (ref 0–0.2)
BASOPHILS NFR BLD: 0.4 %
BILIRUB SERPL-MCNC: 0.3 MG/DL (ref 0–1)
BILIRUB UR QL STRIP.AUTO: NEGATIVE
BUN SERPL-MCNC: 15 MG/DL (ref 7–20)
CALCIUM SERPL-MCNC: 9 MG/DL (ref 8.3–10.6)
CHLORIDE SERPL-SCNC: 105 MMOL/L (ref 99–110)
CLARITY UR: ABNORMAL
CO2 SERPL-SCNC: 25 MMOL/L (ref 21–32)
COLOR UR: YELLOW
CREAT SERPL-MCNC: 1.2 MG/DL (ref 0.6–1.2)
DEPRECATED RDW RBC AUTO: 15.4 % (ref 12.4–15.4)
EKG ATRIAL RATE: 79 BPM
EKG DIAGNOSIS: NORMAL
EKG P AXIS: 13 DEGREES
EKG P-R INTERVAL: 130 MS
EKG Q-T INTERVAL: 364 MS
EKG QRS DURATION: 76 MS
EKG QTC CALCULATION (BAZETT): 417 MS
EKG R AXIS: 13 DEGREES
EKG T AXIS: 133 DEGREES
EKG VENTRICULAR RATE: 79 BPM
EOSINOPHIL # BLD: 0.1 K/UL (ref 0–0.6)
EOSINOPHIL NFR BLD: 0.7 %
EPI CELLS #/AREA URNS AUTO: 2 /HPF (ref 0–5)
FERRITIN SERPL IA-MCNC: 31.4 NG/ML (ref 15–150)
FOLATE SERPL-MCNC: 19.73 NG/ML (ref 4.78–24.2)
GFR SERPLBLD CREATININE-BSD FMLA CKD-EPI: 44 ML/MIN/{1.73_M2}
GLUCOSE BLD-MCNC: 193 MG/DL (ref 70–99)
GLUCOSE BLD-MCNC: 218 MG/DL (ref 70–99)
GLUCOSE SERPL-MCNC: 279 MG/DL (ref 70–99)
GLUCOSE UR STRIP.AUTO-MCNC: >=1000 MG/DL
HCT VFR BLD AUTO: 32.1 % (ref 36–48)
HGB BLD-MCNC: 10.3 G/DL (ref 12–16)
HGB UR QL STRIP.AUTO: NEGATIVE
HYALINE CASTS #/AREA URNS AUTO: 0 /LPF (ref 0–8)
IRON SATN MFR SERPL: 9 % (ref 15–50)
IRON SERPL-MCNC: 32 UG/DL (ref 37–145)
KETONES UR STRIP.AUTO-MCNC: NEGATIVE MG/DL
LEUKOCYTE ESTERASE UR QL STRIP.AUTO: ABNORMAL
LYMPHOCYTES # BLD: 1.3 K/UL (ref 1–5.1)
LYMPHOCYTES NFR BLD: 18.1 %
MAGNESIUM SERPL-MCNC: 1.3 MG/DL (ref 1.8–2.4)
MAGNESIUM SERPL-MCNC: 1.3 MG/DL (ref 1.8–2.4)
MCH RBC QN AUTO: 29.1 PG (ref 26–34)
MCHC RBC AUTO-ENTMCNC: 32.1 G/DL (ref 31–36)
MCV RBC AUTO: 90.6 FL (ref 80–100)
MONOCYTES # BLD: 0.5 K/UL (ref 0–1.3)
MONOCYTES NFR BLD: 7.2 %
NEUTROPHILS # BLD: 5.3 K/UL (ref 1.7–7.7)
NEUTROPHILS NFR BLD: 73.6 %
NITRITE UR QL STRIP.AUTO: NEGATIVE
NT-PROBNP SERPL-MCNC: 887 PG/ML (ref 0–449)
PERFORMED ON: ABNORMAL
PERFORMED ON: ABNORMAL
PH UR STRIP.AUTO: 6.5 [PH] (ref 5–8)
PLATELET # BLD AUTO: 236 K/UL (ref 135–450)
PMV BLD AUTO: 8.3 FL (ref 5–10.5)
POTASSIUM SERPL-SCNC: 4.7 MMOL/L (ref 3.5–5.1)
PROT SERPL-MCNC: 6.6 G/DL (ref 6.4–8.2)
PROT UR STRIP.AUTO-MCNC: ABNORMAL MG/DL
RBC # BLD AUTO: 3.54 M/UL (ref 4–5.2)
RBC CLUMPS #/AREA URNS AUTO: 1 /HPF (ref 0–4)
SODIUM SERPL-SCNC: 141 MMOL/L (ref 136–145)
SP GR UR STRIP.AUTO: 1.02 (ref 1–1.03)
TIBC SERPL-MCNC: 347 UG/DL (ref 260–445)
TROPONIN T SERPL-MCNC: <0.01 NG/ML
TSH SERPL DL<=0.005 MIU/L-ACNC: 3.32 UIU/ML (ref 0.27–4.2)
UA COMPLETE W REFLEX CULTURE PNL UR: YES
UA DIPSTICK W REFLEX MICRO PNL UR: YES
URN SPEC COLLECT METH UR: ABNORMAL
UROBILINOGEN UR STRIP-ACNC: 1 E.U./DL
VIT B12 SERPL-MCNC: 882 PG/ML (ref 211–911)
WBC # BLD AUTO: 7.2 K/UL (ref 4–11)
WBC #/AREA URNS AUTO: 74 /HPF (ref 0–5)

## 2023-03-30 PROCEDURE — 87186 SC STD MICRODIL/AGAR DIL: CPT

## 2023-03-30 PROCEDURE — 87077 CULTURE AEROBIC IDENTIFY: CPT

## 2023-03-30 PROCEDURE — 81001 URINALYSIS AUTO W/SCOPE: CPT

## 2023-03-30 PROCEDURE — 2580000003 HC RX 258: Performed by: PHYSICIAN ASSISTANT

## 2023-03-30 PROCEDURE — 83036 HEMOGLOBIN GLYCOSYLATED A1C: CPT

## 2023-03-30 PROCEDURE — 83540 ASSAY OF IRON: CPT

## 2023-03-30 PROCEDURE — 2580000003 HC RX 258: Performed by: INTERNAL MEDICINE

## 2023-03-30 PROCEDURE — 82306 VITAMIN D 25 HYDROXY: CPT

## 2023-03-30 PROCEDURE — 84443 ASSAY THYROID STIM HORMONE: CPT

## 2023-03-30 PROCEDURE — 93005 ELECTROCARDIOGRAM TRACING: CPT | Performed by: INTERNAL MEDICINE

## 2023-03-30 PROCEDURE — 85025 COMPLETE CBC W/AUTO DIFF WBC: CPT

## 2023-03-30 PROCEDURE — 6360000002 HC RX W HCPCS: Performed by: INTERNAL MEDICINE

## 2023-03-30 PROCEDURE — 82728 ASSAY OF FERRITIN: CPT

## 2023-03-30 PROCEDURE — 99285 EMERGENCY DEPT VISIT HI MDM: CPT

## 2023-03-30 PROCEDURE — 87040 BLOOD CULTURE FOR BACTERIA: CPT

## 2023-03-30 PROCEDURE — 71045 X-RAY EXAM CHEST 1 VIEW: CPT

## 2023-03-30 PROCEDURE — 70450 CT HEAD/BRAIN W/O DYE: CPT

## 2023-03-30 PROCEDURE — 36415 COLL VENOUS BLD VENIPUNCTURE: CPT

## 2023-03-30 PROCEDURE — 84484 ASSAY OF TROPONIN QUANT: CPT

## 2023-03-30 PROCEDURE — 96375 TX/PRO/DX INJ NEW DRUG ADDON: CPT

## 2023-03-30 PROCEDURE — 87086 URINE CULTURE/COLONY COUNT: CPT

## 2023-03-30 PROCEDURE — 96365 THER/PROPH/DIAG IV INF INIT: CPT

## 2023-03-30 PROCEDURE — 6370000000 HC RX 637 (ALT 250 FOR IP): Performed by: INTERNAL MEDICINE

## 2023-03-30 PROCEDURE — 82607 VITAMIN B-12: CPT

## 2023-03-30 PROCEDURE — 83735 ASSAY OF MAGNESIUM: CPT

## 2023-03-30 PROCEDURE — 6370000000 HC RX 637 (ALT 250 FOR IP): Performed by: PHYSICIAN ASSISTANT

## 2023-03-30 PROCEDURE — 82746 ASSAY OF FOLIC ACID SERUM: CPT

## 2023-03-30 PROCEDURE — 6360000002 HC RX W HCPCS: Performed by: PHYSICIAN ASSISTANT

## 2023-03-30 PROCEDURE — 93010 ELECTROCARDIOGRAM REPORT: CPT | Performed by: INTERNAL MEDICINE

## 2023-03-30 PROCEDURE — 80053 COMPREHEN METABOLIC PANEL: CPT

## 2023-03-30 PROCEDURE — 1200000000 HC SEMI PRIVATE

## 2023-03-30 PROCEDURE — 93005 ELECTROCARDIOGRAM TRACING: CPT | Performed by: EMERGENCY MEDICINE

## 2023-03-30 PROCEDURE — 83880 ASSAY OF NATRIURETIC PEPTIDE: CPT

## 2023-03-30 PROCEDURE — 83550 IRON BINDING TEST: CPT

## 2023-03-30 RX ORDER — INSULIN LISPRO 100 [IU]/ML
0-4 INJECTION, SOLUTION INTRAVENOUS; SUBCUTANEOUS
Status: DISCONTINUED | OUTPATIENT
Start: 2023-03-30 | End: 2023-03-31

## 2023-03-30 RX ORDER — ONDANSETRON 2 MG/ML
4 INJECTION INTRAMUSCULAR; INTRAVENOUS ONCE
Status: COMPLETED | OUTPATIENT
Start: 2023-03-30 | End: 2023-03-30

## 2023-03-30 RX ORDER — INSULIN LISPRO 100 [IU]/ML
0-4 INJECTION, SOLUTION INTRAVENOUS; SUBCUTANEOUS NIGHTLY
Status: DISCONTINUED | OUTPATIENT
Start: 2023-03-30 | End: 2023-03-31

## 2023-03-30 RX ORDER — POLYETHYLENE GLYCOL 3350 17 G/17G
17 POWDER, FOR SOLUTION ORAL DAILY PRN
Status: DISCONTINUED | OUTPATIENT
Start: 2023-03-30 | End: 2023-04-05 | Stop reason: HOSPADM

## 2023-03-30 RX ORDER — ONDANSETRON 2 MG/ML
4 INJECTION INTRAMUSCULAR; INTRAVENOUS EVERY 6 HOURS PRN
Status: DISCONTINUED | OUTPATIENT
Start: 2023-03-30 | End: 2023-04-05 | Stop reason: HOSPADM

## 2023-03-30 RX ORDER — MAGNESIUM SULFATE 1 G/100ML
1000 INJECTION INTRAVENOUS ONCE
Status: DISCONTINUED | OUTPATIENT
Start: 2023-03-30 | End: 2023-03-30

## 2023-03-30 RX ORDER — SODIUM CHLORIDE 0.9 % (FLUSH) 0.9 %
5-40 SYRINGE (ML) INJECTION EVERY 12 HOURS SCHEDULED
Status: DISCONTINUED | OUTPATIENT
Start: 2023-03-30 | End: 2023-04-05 | Stop reason: HOSPADM

## 2023-03-30 RX ORDER — ASPIRIN 81 MG/1
81 TABLET, CHEWABLE ORAL DAILY
Status: DISCONTINUED | OUTPATIENT
Start: 2023-03-30 | End: 2023-04-05 | Stop reason: HOSPADM

## 2023-03-30 RX ORDER — ENOXAPARIN SODIUM 100 MG/ML
40 INJECTION SUBCUTANEOUS DAILY
Status: DISCONTINUED | OUTPATIENT
Start: 2023-03-31 | End: 2023-04-04

## 2023-03-30 RX ORDER — ONDANSETRON 4 MG/1
4 TABLET, ORALLY DISINTEGRATING ORAL EVERY 8 HOURS PRN
Status: DISCONTINUED | OUTPATIENT
Start: 2023-03-30 | End: 2023-04-05 | Stop reason: HOSPADM

## 2023-03-30 RX ORDER — MECLIZINE HCL 12.5 MG/1
25 TABLET ORAL ONCE
Status: COMPLETED | OUTPATIENT
Start: 2023-03-30 | End: 2023-03-30

## 2023-03-30 RX ORDER — ACETAMINOPHEN 325 MG/1
650 TABLET ORAL EVERY 6 HOURS PRN
Status: DISCONTINUED | OUTPATIENT
Start: 2023-03-30 | End: 2023-04-05 | Stop reason: HOSPADM

## 2023-03-30 RX ORDER — SODIUM CHLORIDE 0.9 % (FLUSH) 0.9 %
5-40 SYRINGE (ML) INJECTION PRN
Status: DISCONTINUED | OUTPATIENT
Start: 2023-03-30 | End: 2023-04-05 | Stop reason: HOSPADM

## 2023-03-30 RX ORDER — MAGNESIUM SULFATE IN WATER 40 MG/ML
2000 INJECTION, SOLUTION INTRAVENOUS ONCE
Status: COMPLETED | OUTPATIENT
Start: 2023-03-30 | End: 2023-03-30

## 2023-03-30 RX ORDER — DEXTROSE MONOHYDRATE 100 MG/ML
INJECTION, SOLUTION INTRAVENOUS CONTINUOUS PRN
Status: DISCONTINUED | OUTPATIENT
Start: 2023-03-30 | End: 2023-04-05 | Stop reason: HOSPADM

## 2023-03-30 RX ORDER — ACETAMINOPHEN 650 MG/1
650 SUPPOSITORY RECTAL EVERY 6 HOURS PRN
Status: DISCONTINUED | OUTPATIENT
Start: 2023-03-30 | End: 2023-04-05 | Stop reason: HOSPADM

## 2023-03-30 RX ORDER — VALSARTAN 40 MG/1
40 TABLET ORAL DAILY
Status: ON HOLD | COMMUNITY
End: 2023-04-05 | Stop reason: HOSPADM

## 2023-03-30 RX ORDER — REPAGLINIDE 2 MG/1
2 TABLET ORAL
COMMUNITY

## 2023-03-30 RX ORDER — SODIUM CHLORIDE 9 MG/ML
INJECTION, SOLUTION INTRAVENOUS PRN
Status: DISCONTINUED | OUTPATIENT
Start: 2023-03-30 | End: 2023-04-05 | Stop reason: HOSPADM

## 2023-03-30 RX ORDER — 0.9 % SODIUM CHLORIDE 0.9 %
1000 INTRAVENOUS SOLUTION INTRAVENOUS ONCE
Status: COMPLETED | OUTPATIENT
Start: 2023-03-30 | End: 2023-03-30

## 2023-03-30 RX ORDER — CYANOCOBALAMIN 1000 UG/ML
1000 INJECTION, SOLUTION INTRAMUSCULAR; SUBCUTANEOUS
COMMUNITY

## 2023-03-30 RX ADMIN — SODIUM CHLORIDE: 9 INJECTION, SOLUTION INTRAVENOUS at 15:59

## 2023-03-30 RX ADMIN — CEFTRIAXONE SODIUM 1000 MG: 1 INJECTION, POWDER, FOR SOLUTION INTRAMUSCULAR; INTRAVENOUS at 11:54

## 2023-03-30 RX ADMIN — ASPIRIN 81 MG 81 MG: 81 TABLET ORAL at 16:46

## 2023-03-30 RX ADMIN — METOPROLOL TARTRATE 25 MG: 25 TABLET, FILM COATED ORAL at 20:59

## 2023-03-30 RX ADMIN — SODIUM CHLORIDE 1000 ML: 9 INJECTION, SOLUTION INTRAVENOUS at 11:51

## 2023-03-30 RX ADMIN — Medication 10 ML: at 21:29

## 2023-03-30 RX ADMIN — ONDANSETRON 4 MG: 2 INJECTION INTRAMUSCULAR; INTRAVENOUS at 11:50

## 2023-03-30 RX ADMIN — MAGNESIUM SULFATE HEPTAHYDRATE 2000 MG: 40 INJECTION, SOLUTION INTRAVENOUS at 16:02

## 2023-03-30 RX ADMIN — MECLIZINE 25 MG: 12.5 TABLET ORAL at 11:55

## 2023-03-30 ASSESSMENT — ENCOUNTER SYMPTOMS
SHORTNESS OF BREATH: 0
DIARRHEA: 0
ABDOMINAL PAIN: 0
NAUSEA: 0
VOMITING: 0
COUGH: 0
CHEST TIGHTNESS: 0

## 2023-03-30 ASSESSMENT — PAIN - FUNCTIONAL ASSESSMENT: PAIN_FUNCTIONAL_ASSESSMENT: NONE - DENIES PAIN

## 2023-03-30 ASSESSMENT — LIFESTYLE VARIABLES
HOW OFTEN DO YOU HAVE A DRINK CONTAINING ALCOHOL: NEVER
HOW MANY STANDARD DRINKS CONTAINING ALCOHOL DO YOU HAVE ON A TYPICAL DAY: PATIENT DOES NOT DRINK

## 2023-03-30 NOTE — H&P
Physical Exam:  BP (!) 151/74   Pulse 93   Temp 97.7 °F (36.5 °C) (Oral)   Resp 18   Ht 5' 4\" (1.626 m)   Wt 208 lb (94.3 kg)   SpO2 98%   BMI 35.70 kg/m²     General appearance: No apparent distress appears stated age and cooperative. HEENT Normal cephalic, atraumatic without obvious deformity. PERRLA  Neck: Supple, No jugular venous distention/bruits. Trachea midline without thyromegaly   Lungs: Clear to auscultation, bilaterally without Rales/Wheezes/Rhonchi   Heart: Regular rate and rhythm with Normal S1/S2 without murmurs  Abdomen: Soft, non-tender, non-distended. Positive bowel sounds all four quadrants. Extremities: No clubbing, cyanosis, or BLE edema noted  Neurologic: CN 2-12 grossly intact. No speech or motor deficits  Psychiatry: Appropriate affect. Not agitated  Skin: Warm, dry, normal turgor, no rash  Brisk capillary refill, peripheral pulses palpable     Labs:  CBC:   Lab Results   Component Value Date/Time    WBC 7.2 03/30/2023 10:35 AM    RBC 3.54 03/30/2023 10:35 AM    HGB 10.3 03/30/2023 10:35 AM    HCT 32.1 03/30/2023 10:35 AM    MCV 90.6 03/30/2023 10:35 AM    MCH 29.1 03/30/2023 10:35 AM    MCHC 32.1 03/30/2023 10:35 AM    RDW 15.4 03/30/2023 10:35 AM     03/30/2023 10:35 AM    MPV 8.3 03/30/2023 10:35 AM     BMP:    Lab Results   Component Value Date/Time     03/30/2023 10:35 AM    K 4.7 03/30/2023 10:35 AM    K 5.3 01/06/2022 07:00 AM     03/30/2023 10:35 AM    CO2 25 03/30/2023 10:35 AM    BUN 15 03/30/2023 10:35 AM    CREATININE 1.2 03/30/2023 10:35 AM    CALCIUM 9.0 03/30/2023 10:35 AM    GFRAA >60 01/06/2022 07:00 AM    GFRAA >60 01/22/2013 12:25 PM    LABGLOM 44 03/30/2023 10:35 AM    GLUCOSE 279 03/30/2023 10:35 AM     CT HEAD WO CONTRAST   Final Result   No acute intracranial abnormality.          XR CHEST PORTABLE   Final Result   No acute disease             Discussed case  with ED physician    Problem List  Principal Problem:    UTI (urinary tract

## 2023-03-30 NOTE — ED PROVIDER NOTES
Review of Systems   Constitutional:  Negative for chills and fever. Respiratory:  Negative for cough, chest tightness and shortness of breath. Cardiovascular:  Negative for chest pain and palpitations. Gastrointestinal:  Negative for abdominal pain, diarrhea, nausea and vomiting. Genitourinary:  Negative for difficulty urinating, dysuria, flank pain, frequency, hematuria and urgency. Neurological:  Positive for dizziness and light-headedness. Negative for tremors, seizures, syncope, facial asymmetry, speech difficulty, weakness, numbness and headaches. All other systems reviewed and are negative. Positives and Pertinent negatives as per HPI. SURGICAL HISTORY     Past Surgical History:   Procedure Laterality Date    BACK SURGERY      BREAST SURGERY      CARDIAC SURGERY      STENTS X3    CHOLECYSTECTOMY, LAPAROSCOPIC      EYE SURGERY      KNEE SURGERY      right       CURRENTMEDICATIONS       Previous Medications    ASPIRIN 81 MG CHEWABLE TABLET    Take 81 mg by mouth daily. CLOPIDOGREL (PLAVIX) 75 MG TABLET    Take 75 mg by mouth daily. DICLOFENAC SODIUM (VOLTAREN) 1 % GEL    Apply 2 g topically 4 times daily as needed for Pain    HYDROCHLOROTHIAZIDE (HYDRODIURIL) 25 MG TABLET    Take 1 tablet by mouth daily as needed (for swelling)    LANSOPRAZOLE (PREVACID) 30 MG CAPSULE    Take 30 mg by mouth daily. LIDOCAINE (LIDODERM) 5 %    Place 1 patch onto the skin daily 12 hours on, 12 hours off. METOPROLOL (LOPRESSOR) 25 MG TABLET    Take 25 mg by mouth 2 times daily     NATEGLINIDE (STARLIX) 120 MG TABLET    Take 120 mg by mouth 3 times daily (before meals)    NITROGLYCERIN (NITROSTAT) 0.4 MG SL TABLET    Place 1 tablet under the tongue every 5 minutes as needed for Chest pain. ONDANSETRON (ZOFRAN ODT) 8 MG TBDP DISINTEGRATING TABLET    Take 1 tablet by mouth every 8 hours as needed for Nausea    ROSUVASTATIN (CRESTOR) 10 MG TABLET    Take 10 mg by mouth daily.

## 2023-03-30 NOTE — ED PROVIDER NOTES
Arm, Right (5b): No drift  Motor Leg, Left (6a): No drift  Motor Leg, Right (6b): No drift  Limb Ataxia (7): Absent  Sensory (8): Normal  Best Language (9): No aphasia  Dysarthria (10): Normal  Extinction and Inattention (11): No abnormality  Total: 0                 Medications Given During ED Visit  Medications - No data to display    Records Reviewed  ***    Social Determinants of Health  {Social Determinants of Health (EM 2023) (Optional):00678}    Chronic Conditions affecting care   has a past medical history of Acute MI ([facility] [address] Arthritis, CAD (coronary artery disease), Diabetes mellitus ([facility] [address] GERD (gastroesophageal reflux disease), History of blood transfusion, Hyperlipidemia, Hypertension, and Nausea & vomiting. MDM and ED Course  ***(EMP MDM)          The total Critical Care time is *** minutes which excludes separately billable procedures. Final Impression  No diagnosis found. Blood pressure (!) 151/74, pulse 93, temperature 97.7 °F (36.5 °C), temperature source Oral, resp. rate 18, height 5' 4\" (1.626 m), weight 208 lb (94.3 kg), SpO2 98 %, not currently breastfeeding. Disposition:  DISPOSITION        Patient Referrals:  No follow-up provider specified. Discharge Medications:  New Prescriptions    No medications on file       This chart was generated using the [address] [place] [address] [address] [employer] system. I created this record but it may contain dictation errors given the limitations of this technology.

## 2023-03-31 LAB
ANION GAP SERPL CALCULATED.3IONS-SCNC: 8 MMOL/L (ref 3–16)
BASOPHILS # BLD: 0 K/UL (ref 0–0.2)
BASOPHILS NFR BLD: 0.5 %
BUN SERPL-MCNC: 14 MG/DL (ref 7–20)
CALCIUM SERPL-MCNC: 8.6 MG/DL (ref 8.3–10.6)
CHLORIDE SERPL-SCNC: 109 MMOL/L (ref 99–110)
CO2 SERPL-SCNC: 23 MMOL/L (ref 21–32)
CREAT SERPL-MCNC: 1.4 MG/DL (ref 0.6–1.2)
DEPRECATED RDW RBC AUTO: 16 % (ref 12.4–15.4)
EKG ATRIAL RATE: 66 BPM
EKG DIAGNOSIS: NORMAL
EKG P AXIS: 2 DEGREES
EKG P-R INTERVAL: 138 MS
EKG Q-T INTERVAL: 406 MS
EKG QRS DURATION: 78 MS
EKG QTC CALCULATION (BAZETT): 425 MS
EKG R AXIS: -3 DEGREES
EKG T AXIS: 112 DEGREES
EKG VENTRICULAR RATE: 66 BPM
EOSINOPHIL # BLD: 0.2 K/UL (ref 0–0.6)
EOSINOPHIL NFR BLD: 2.6 %
EST. AVERAGE GLUCOSE BLD GHB EST-MCNC: 274.7 MG/DL
GFR SERPLBLD CREATININE-BSD FMLA CKD-EPI: 36 ML/MIN/{1.73_M2}
GLUCOSE BLD-MCNC: 213 MG/DL (ref 70–99)
GLUCOSE BLD-MCNC: 216 MG/DL (ref 70–99)
GLUCOSE BLD-MCNC: 216 MG/DL (ref 70–99)
GLUCOSE BLD-MCNC: 239 MG/DL (ref 70–99)
GLUCOSE SERPL-MCNC: 211 MG/DL (ref 70–99)
HBA1C MFR BLD: 11.2 %
HCT VFR BLD AUTO: 29.6 % (ref 36–48)
HGB BLD-MCNC: 9.2 G/DL (ref 12–16)
LV EF: 55 %
LVEF MODALITY: NORMAL
LYMPHOCYTES # BLD: 1.7 K/UL (ref 1–5.1)
LYMPHOCYTES NFR BLD: 23.3 %
MAGNESIUM SERPL-MCNC: 1.6 MG/DL (ref 1.8–2.4)
MCH RBC QN AUTO: 28.5 PG (ref 26–34)
MCHC RBC AUTO-ENTMCNC: 31.2 G/DL (ref 31–36)
MCV RBC AUTO: 91.4 FL (ref 80–100)
MONOCYTES # BLD: 0.6 K/UL (ref 0–1.3)
MONOCYTES NFR BLD: 8.3 %
NEUTROPHILS # BLD: 4.7 K/UL (ref 1.7–7.7)
NEUTROPHILS NFR BLD: 65.3 %
PERFORMED ON: ABNORMAL
PLATELET # BLD AUTO: 195 K/UL (ref 135–450)
PMV BLD AUTO: 7.7 FL (ref 5–10.5)
POTASSIUM SERPL-SCNC: 4.6 MMOL/L (ref 3.5–5.1)
RBC # BLD AUTO: 3.24 M/UL (ref 4–5.2)
SARS-COV-2 RDRP RESP QL NAA+PROBE: NOT DETECTED
SODIUM SERPL-SCNC: 140 MMOL/L (ref 136–145)
WBC # BLD AUTO: 7.2 K/UL (ref 4–11)

## 2023-03-31 PROCEDURE — 36415 COLL VENOUS BLD VENIPUNCTURE: CPT

## 2023-03-31 PROCEDURE — 6360000004 HC RX CONTRAST MEDICATION: Performed by: INTERNAL MEDICINE

## 2023-03-31 PROCEDURE — 97161 PT EVAL LOW COMPLEX 20 MIN: CPT

## 2023-03-31 PROCEDURE — 85025 COMPLETE CBC W/AUTO DIFF WBC: CPT

## 2023-03-31 PROCEDURE — 6370000000 HC RX 637 (ALT 250 FOR IP): Performed by: INTERNAL MEDICINE

## 2023-03-31 PROCEDURE — 2580000003 HC RX 258: Performed by: INTERNAL MEDICINE

## 2023-03-31 PROCEDURE — 6360000002 HC RX W HCPCS: Performed by: INTERNAL MEDICINE

## 2023-03-31 PROCEDURE — 97535 SELF CARE MNGMENT TRAINING: CPT

## 2023-03-31 PROCEDURE — 97530 THERAPEUTIC ACTIVITIES: CPT

## 2023-03-31 PROCEDURE — C8929 TTE W OR WO FOL WCON,DOPPLER: HCPCS

## 2023-03-31 PROCEDURE — 1200000000 HC SEMI PRIVATE

## 2023-03-31 PROCEDURE — 83735 ASSAY OF MAGNESIUM: CPT

## 2023-03-31 PROCEDURE — 93010 ELECTROCARDIOGRAM REPORT: CPT | Performed by: INTERNAL MEDICINE

## 2023-03-31 PROCEDURE — 87635 SARS-COV-2 COVID-19 AMP PRB: CPT

## 2023-03-31 PROCEDURE — 99222 1ST HOSP IP/OBS MODERATE 55: CPT | Performed by: INTERNAL MEDICINE

## 2023-03-31 PROCEDURE — 80048 BASIC METABOLIC PNL TOTAL CA: CPT

## 2023-03-31 PROCEDURE — 99223 1ST HOSP IP/OBS HIGH 75: CPT | Performed by: INTERNAL MEDICINE

## 2023-03-31 PROCEDURE — 97165 OT EVAL LOW COMPLEX 30 MIN: CPT

## 2023-03-31 RX ORDER — INSULIN LISPRO 100 [IU]/ML
0-8 INJECTION, SOLUTION INTRAVENOUS; SUBCUTANEOUS
Status: DISCONTINUED | OUTPATIENT
Start: 2023-03-31 | End: 2023-04-05 | Stop reason: HOSPADM

## 2023-03-31 RX ORDER — INSULIN LISPRO 100 [IU]/ML
0-4 INJECTION, SOLUTION INTRAVENOUS; SUBCUTANEOUS NIGHTLY
Status: DISCONTINUED | OUTPATIENT
Start: 2023-03-31 | End: 2023-04-05 | Stop reason: HOSPADM

## 2023-03-31 RX ORDER — MAGNESIUM SULFATE 1 G/100ML
1000 INJECTION INTRAVENOUS ONCE
Status: COMPLETED | OUTPATIENT
Start: 2023-03-31 | End: 2023-03-31

## 2023-03-31 RX ORDER — VITAMIN B COMPLEX
2000 TABLET ORAL DAILY
Status: DISCONTINUED | OUTPATIENT
Start: 2023-03-31 | End: 2023-04-05 | Stop reason: HOSPADM

## 2023-03-31 RX ADMIN — METOPROLOL TARTRATE 25 MG: 25 TABLET, FILM COATED ORAL at 08:28

## 2023-03-31 RX ADMIN — ASPIRIN 81 MG 81 MG: 81 TABLET ORAL at 08:27

## 2023-03-31 RX ADMIN — METOPROLOL TARTRATE 25 MG: 25 TABLET, FILM COATED ORAL at 20:38

## 2023-03-31 RX ADMIN — MAGNESIUM SULFATE HEPTAHYDRATE 1000 MG: 1 INJECTION, SOLUTION INTRAVENOUS at 11:11

## 2023-03-31 RX ADMIN — ENOXAPARIN SODIUM 40 MG: 100 INJECTION SUBCUTANEOUS at 08:30

## 2023-03-31 RX ADMIN — Medication 2000 UNITS: at 18:03

## 2023-03-31 RX ADMIN — Medication 10 ML: at 20:39

## 2023-03-31 RX ADMIN — CEFTRIAXONE SODIUM 1000 MG: 1 INJECTION, POWDER, FOR SOLUTION INTRAMUSCULAR; INTRAVENOUS at 13:39

## 2023-03-31 RX ADMIN — Medication 10 ML: at 08:28

## 2023-03-31 RX ADMIN — INSULIN LISPRO 2 UNITS: 100 INJECTION, SOLUTION INTRAVENOUS; SUBCUTANEOUS at 13:11

## 2023-03-31 RX ADMIN — PERFLUTREN 1.5 ML: 6.52 INJECTION, SUSPENSION INTRAVENOUS at 11:01

## 2023-03-31 NOTE — CONSULTS
Nose: Nares normal   Throat: Lips normal   Neck: Supple, symmetrical, trachea midline,  no carotid bruit or JVD       Lungs:   Clear to auscultation bilaterally, respirations unlabored   Chest Wall:  No tenderness or deformity   Heart:  Regular rate and rhythm, S1, S2 normal, no murmur, rub or gallop   Abdomen:   Soft, non-tender, bowel sounds active all four quadrants,  no masses, no organomegaly           Extremities: Extremities normal, atraumatic, no cyanosis. Trace to 1+ edema   Pulses: 2+ and symmetric   Skin: Skin color, texture, turgor normal, no rashes or lesions   Pysch: Normal mood and affect   Neurologic: Normal gross motor and sensory exam.         EKG:  I have reviewed EKG with the following interpretation:  Impression:    Sinus  Rhythm  - frequent ectopic ventricular beats # VECs = 5  Low voltage in precordial leads. Poor R wave progression cannot rule out Anteroseptal infarct -age undetermined. Nonspecific ST depression  -Nondiagnostic. TELEMETRY (Personally reviewed by me): Sinus     Lab Data:  CBC:   Recent Labs     03/30/23  1035 03/31/23  0508   WBC 7.2 7.2   HGB 10.3* 9.2*   HCT 32.1* 29.6*   MCV 90.6 91.4    195     BMP:   Recent Labs     03/30/23  1035 03/31/23  0508    140   K 4.7 4.6    109   CO2 25 23   BUN 15 14   CREATININE 1.2 1.4*     LIVER PROFILE:   Recent Labs     03/30/23  1035   AST 10*   ALT 9*   BILITOT 0.3   ALKPHOS 63     PT/INR: No results for input(s): PROTIME, INR in the last 72 hours. APTT: No results for input(s): APTT in the last 72 hours. BNP:  No results for input(s): BNP in the last 72 hours. Imaging/Procedures:     Echo 3/31/2023:   Summary   -Definity was used to assist with endocardial border delineation.   -Mild concentric left ventricular hypertrophy.   -Normal left ventricular cavity size and systolic function with an estimated   ejection fraction of 55%.    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
Electronically signed by: Bala Pisano MD, 3/31/2023, 9:44 AM      [facility] [id number]  Office : [phone]  Fax :[phone]

## 2023-03-31 NOTE — PLAN OF CARE
Problem: Chronic Conditions and Co-morbidities  Goal: Patient's chronic conditions and co-morbidity symptoms are monitored and maintained or improved  Outcome: Progressing     Problem: Safety - Adult  Goal: Free from fall injury  Outcome: Progressing  Flowsheets (Taken 3/30/2023 1659 by Tiffany Isaac RN)  Free From Fall Injury: Instruct family/caregiver on patient safety     Problem: Skin/Tissue Integrity  Goal: Absence of new skin breakdown  Description: 1. Monitor for areas of redness and/or skin breakdown  2. Assess vascular access sites hourly  3. Every 4-6 hours minimum:  Change oxygen saturation probe site  4. Every 4-6 hours:  If on nasal continuous positive airway pressure, respiratory therapy assess nares and determine need for appliance change or resting period.   Outcome: Progressing     Problem: ABCDS Injury Assessment  Goal: Absence of physical injury  Outcome: Progressing  Flowsheets (Taken 3/30/2023 1659 by Tiffany Isaac RN)  Absence of Physical Injury: Implement safety measures based on patient assessment

## 2023-03-31 NOTE — PLAN OF CARE
Problem: Chronic Conditions and Co-morbidities  Goal: Patient's chronic conditions and co-morbidity symptoms are monitored and maintained or improved  3/31/2023 0826 by Odell Joy RN  Outcome: Progressing  Flowsheets (Taken 3/31/2023 1747)  Care Plan - Patient's Chronic Conditions and Co-Morbidity Symptoms are Monitored and Maintained or Improved: Monitor and assess patient's chronic conditions and comorbid symptoms for stability, deterioration, or improvement  3/31/2023 0113 by Americo Course  Outcome: Progressing     Problem: Safety - Adult  Goal: Free from fall injury  3/31/2023 0826 by Odell Joy RN  Outcome: Progressing  3/31/2023 0113 by Americo Course  Outcome: Hermon Prey (Taken 3/30/2023 1659 by Odell Joy RN)  Free From Fall Injury: Instruct family/caregiver on patient safety     Problem: Skin/Tissue Integrity  Goal: Absence of new skin breakdown  Description: 1. Monitor for areas of redness and/or skin breakdown  2. Assess vascular access sites hourly  3. Every 4-6 hours minimum:  Change oxygen saturation probe site  4. Every 4-6 hours:  If on nasal continuous positive airway pressure, respiratory therapy assess nares and determine need for appliance change or resting period.   3/31/2023 0826 by Odell Joy RN  Outcome: Progressing  3/31/2023 0113 by Americo Course  Outcome: Progressing     Problem: ABCDS Injury Assessment  Goal: Absence of physical injury  3/31/2023 0826 by Odell Joy RN  Outcome: Progressing  3/31/2023 0113 by Americo Course  Outcome: Femion Prey (Taken 3/30/2023 1659 by Odell Joy RN)  Absence of Physical Injury: Implement safety measures based on patient assessment

## 2023-03-31 NOTE — CARE COORDINATION
Case Management Assessment  Initial Evaluation    Date/Time of Evaluation: 3/31/2023 2:19 PM  Assessment Completed by: VIRIDIANA Moran    If patient is discharged prior to next notation, then this note serves as note for discharge by case management. Patient Name: Jose Gill                   YOB: 1935  Diagnosis: Hypomagnesemia [E83.42]  Lightheadedness [R42]  UTI (urinary tract infection) [N39.0]  Acute cystitis without hematuria [N30.00]                   Date / Time: 3/30/2023  8:19 AM    Patient Admission Status: Inpatient   Readmission Risk (Low < 19, Mod (19-27), High > 27): Readmission Risk Score: 13.8    Current PCP: Tiny Latham  PCP verified by CM? Yes    Chart Reviewed: Yes      History Provided by: Patient  Patient Orientation: Alert and Oriented    Patient Cognition: Alert    Hospitalization in the last 30 days (Readmission):  No    If yes, Readmission Assessment in CM Navigator will be completed. Advance Directives:      Code Status: Full Code   Patient's Primary Decision Maker is: Legal Next of Kin      Discharge Planning:    Patient lives with: Alone Type of Home: House  Primary Care Giver: Self  Patient Support Systems include: Children, Family Members   Current Financial resources: Medicare  Current community resources:    Current services prior to admission: Durable Medical Equipment            Current DME: Cane            Type of Home Care services:  None    ADLS  Prior functional level: Independent in ADLs/IADLs  Current functional level:      PT AM-PAC: 18 /24  OT AM-PAC: 23 /24    Family can provide assistance at DC: Yes  Would you like Case Management to discuss the discharge plan with any other family members/significant others, and if so, who?  Yes  Plans to Return to Present Housing: Yes  Other Identified Issues/Barriers to RETURNING to current housing:   Potential Assistance needed at discharge: Home Care            Potential DME:    Patient expects to discharge

## 2023-04-01 PROBLEM — N17.9 AKI (ACUTE KIDNEY INJURY) (HCC): Status: ACTIVE | Noted: 2023-04-01

## 2023-04-01 PROBLEM — N18.32 STAGE 3B CHRONIC KIDNEY DISEASE (HCC): Status: ACTIVE | Noted: 2023-04-01

## 2023-04-01 LAB
ANION GAP SERPL CALCULATED.3IONS-SCNC: 9 MMOL/L (ref 3–16)
BACTERIA UR CULT: ABNORMAL
BASOPHILS # BLD: 0 K/UL (ref 0–0.2)
BASOPHILS NFR BLD: 0.3 %
BUN SERPL-MCNC: 19 MG/DL (ref 7–20)
CALCIUM SERPL-MCNC: 9 MG/DL (ref 8.3–10.6)
CHLORIDE SERPL-SCNC: 104 MMOL/L (ref 99–110)
CO2 SERPL-SCNC: 25 MMOL/L (ref 21–32)
CREAT SERPL-MCNC: 1.3 MG/DL (ref 0.6–1.2)
DEPRECATED RDW RBC AUTO: 15.1 % (ref 12.4–15.4)
EOSINOPHIL # BLD: 0.2 K/UL (ref 0–0.6)
EOSINOPHIL NFR BLD: 2.1 %
GFR SERPLBLD CREATININE-BSD FMLA CKD-EPI: 40 ML/MIN/{1.73_M2}
GLUCOSE BLD-MCNC: 172 MG/DL (ref 70–99)
GLUCOSE BLD-MCNC: 208 MG/DL (ref 70–99)
GLUCOSE BLD-MCNC: 212 MG/DL (ref 70–99)
GLUCOSE BLD-MCNC: 230 MG/DL (ref 70–99)
GLUCOSE SERPL-MCNC: 218 MG/DL (ref 70–99)
HCT VFR BLD AUTO: 29.3 % (ref 36–48)
HGB BLD-MCNC: 9.2 G/DL (ref 12–16)
LYMPHOCYTES # BLD: 1.4 K/UL (ref 1–5.1)
LYMPHOCYTES NFR BLD: 17 %
MAGNESIUM SERPL-MCNC: 1.5 MG/DL (ref 1.8–2.4)
MCH RBC QN AUTO: 28.5 PG (ref 26–34)
MCHC RBC AUTO-ENTMCNC: 31.5 G/DL (ref 31–36)
MCV RBC AUTO: 90.5 FL (ref 80–100)
MONOCYTES # BLD: 0.7 K/UL (ref 0–1.3)
MONOCYTES NFR BLD: 9.3 %
NEUTROPHILS # BLD: 5.7 K/UL (ref 1.7–7.7)
NEUTROPHILS NFR BLD: 71.3 %
ORGANISM: ABNORMAL
PERFORMED ON: ABNORMAL
PLATELET # BLD AUTO: 194 K/UL (ref 135–450)
PMV BLD AUTO: 7.8 FL (ref 5–10.5)
POTASSIUM SERPL-SCNC: 4.4 MMOL/L (ref 3.5–5.1)
RBC # BLD AUTO: 3.24 M/UL (ref 4–5.2)
SODIUM SERPL-SCNC: 138 MMOL/L (ref 136–145)
WBC # BLD AUTO: 8 K/UL (ref 4–11)

## 2023-04-01 PROCEDURE — 99233 SBSQ HOSP IP/OBS HIGH 50: CPT | Performed by: INTERNAL MEDICINE

## 2023-04-01 PROCEDURE — 36415 COLL VENOUS BLD VENIPUNCTURE: CPT

## 2023-04-01 PROCEDURE — 1200000000 HC SEMI PRIVATE

## 2023-04-01 PROCEDURE — 6360000002 HC RX W HCPCS: Performed by: INTERNAL MEDICINE

## 2023-04-01 PROCEDURE — 80048 BASIC METABOLIC PNL TOTAL CA: CPT

## 2023-04-01 PROCEDURE — 2580000003 HC RX 258: Performed by: INTERNAL MEDICINE

## 2023-04-01 PROCEDURE — 85025 COMPLETE CBC W/AUTO DIFF WBC: CPT

## 2023-04-01 PROCEDURE — 99232 SBSQ HOSP IP/OBS MODERATE 35: CPT | Performed by: NURSE PRACTITIONER

## 2023-04-01 PROCEDURE — 6370000000 HC RX 637 (ALT 250 FOR IP): Performed by: INTERNAL MEDICINE

## 2023-04-01 PROCEDURE — 6370000000 HC RX 637 (ALT 250 FOR IP): Performed by: NURSE PRACTITIONER

## 2023-04-01 PROCEDURE — 83735 ASSAY OF MAGNESIUM: CPT

## 2023-04-01 RX ORDER — ROSUVASTATIN CALCIUM 10 MG/1
10 TABLET, COATED ORAL NIGHTLY
Status: DISCONTINUED | OUTPATIENT
Start: 2023-04-01 | End: 2023-04-05 | Stop reason: HOSPADM

## 2023-04-01 RX ORDER — MAGNESIUM SULFATE IN WATER 40 MG/ML
2000 INJECTION, SOLUTION INTRAVENOUS ONCE
Status: COMPLETED | OUTPATIENT
Start: 2023-04-01 | End: 2023-04-01

## 2023-04-01 RX ADMIN — INSULIN LISPRO 2 UNITS: 100 INJECTION, SOLUTION INTRAVENOUS; SUBCUTANEOUS at 12:30

## 2023-04-01 RX ADMIN — Medication 10 ML: at 21:14

## 2023-04-01 RX ADMIN — Medication 10 ML: at 09:20

## 2023-04-01 RX ADMIN — METOPROLOL TARTRATE 25 MG: 25 TABLET, FILM COATED ORAL at 09:20

## 2023-04-01 RX ADMIN — Medication 2000 UNITS: at 09:19

## 2023-04-01 RX ADMIN — CEFTRIAXONE SODIUM 1000 MG: 1 INJECTION, POWDER, FOR SOLUTION INTRAMUSCULAR; INTRAVENOUS at 12:30

## 2023-04-01 RX ADMIN — ENOXAPARIN SODIUM 40 MG: 100 INJECTION SUBCUTANEOUS at 09:20

## 2023-04-01 RX ADMIN — MAGNESIUM SULFATE HEPTAHYDRATE 2000 MG: 40 INJECTION, SOLUTION INTRAVENOUS at 14:38

## 2023-04-01 RX ADMIN — ASPIRIN 81 MG 81 MG: 81 TABLET ORAL at 09:19

## 2023-04-01 RX ADMIN — SODIUM CHLORIDE: 9 INJECTION, SOLUTION INTRAVENOUS at 12:30

## 2023-04-01 RX ADMIN — INSULIN LISPRO 2 UNITS: 100 INJECTION, SOLUTION INTRAVENOUS; SUBCUTANEOUS at 09:20

## 2023-04-01 RX ADMIN — METOPROLOL TARTRATE 25 MG: 25 TABLET, FILM COATED ORAL at 21:14

## 2023-04-01 RX ADMIN — ROSUVASTATIN 10 MG: 10 TABLET, FILM COATED ORAL at 21:14

## 2023-04-02 LAB
ANION GAP SERPL CALCULATED.3IONS-SCNC: 10 MMOL/L (ref 3–16)
BASOPHILS # BLD: 0 K/UL (ref 0–0.2)
BASOPHILS NFR BLD: 0.4 %
BUN SERPL-MCNC: 21 MG/DL (ref 7–20)
CALCIUM SERPL-MCNC: 8.8 MG/DL (ref 8.3–10.6)
CHLORIDE SERPL-SCNC: 100 MMOL/L (ref 99–110)
CO2 SERPL-SCNC: 25 MMOL/L (ref 21–32)
CREAT SERPL-MCNC: 1.4 MG/DL (ref 0.6–1.2)
DEPRECATED RDW RBC AUTO: 15.8 % (ref 12.4–15.4)
EOSINOPHIL # BLD: 0.1 K/UL (ref 0–0.6)
EOSINOPHIL NFR BLD: 1.7 %
GFR SERPLBLD CREATININE-BSD FMLA CKD-EPI: 36 ML/MIN/{1.73_M2}
GLUCOSE BLD-MCNC: 187 MG/DL (ref 70–99)
GLUCOSE BLD-MCNC: 189 MG/DL (ref 70–99)
GLUCOSE BLD-MCNC: 200 MG/DL (ref 70–99)
GLUCOSE BLD-MCNC: 203 MG/DL (ref 70–99)
GLUCOSE SERPL-MCNC: 190 MG/DL (ref 70–99)
HCT VFR BLD AUTO: 29.1 % (ref 36–48)
HGB BLD-MCNC: 9.2 G/DL (ref 12–16)
LYMPHOCYTES # BLD: 1 K/UL (ref 1–5.1)
LYMPHOCYTES NFR BLD: 15.6 %
MAGNESIUM SERPL-MCNC: 2.2 MG/DL (ref 1.8–2.4)
MCH RBC QN AUTO: 28.6 PG (ref 26–34)
MCHC RBC AUTO-ENTMCNC: 31.5 G/DL (ref 31–36)
MCV RBC AUTO: 90.9 FL (ref 80–100)
MONOCYTES # BLD: 0.6 K/UL (ref 0–1.3)
MONOCYTES NFR BLD: 8.7 %
NEUTROPHILS # BLD: 4.7 K/UL (ref 1.7–7.7)
NEUTROPHILS NFR BLD: 73.6 %
PERFORMED ON: ABNORMAL
PLATELET # BLD AUTO: 183 K/UL (ref 135–450)
PMV BLD AUTO: 8 FL (ref 5–10.5)
POTASSIUM SERPL-SCNC: 4.3 MMOL/L (ref 3.5–5.1)
RBC # BLD AUTO: 3.2 M/UL (ref 4–5.2)
SODIUM SERPL-SCNC: 135 MMOL/L (ref 136–145)
WBC # BLD AUTO: 6.4 K/UL (ref 4–11)

## 2023-04-02 PROCEDURE — 99232 SBSQ HOSP IP/OBS MODERATE 35: CPT | Performed by: NURSE PRACTITIONER

## 2023-04-02 PROCEDURE — 83735 ASSAY OF MAGNESIUM: CPT

## 2023-04-02 PROCEDURE — 85025 COMPLETE CBC W/AUTO DIFF WBC: CPT

## 2023-04-02 PROCEDURE — 1200000000 HC SEMI PRIVATE

## 2023-04-02 PROCEDURE — 36415 COLL VENOUS BLD VENIPUNCTURE: CPT

## 2023-04-02 PROCEDURE — 6370000000 HC RX 637 (ALT 250 FOR IP): Performed by: INTERNAL MEDICINE

## 2023-04-02 PROCEDURE — 6360000002 HC RX W HCPCS: Performed by: INTERNAL MEDICINE

## 2023-04-02 PROCEDURE — 80048 BASIC METABOLIC PNL TOTAL CA: CPT

## 2023-04-02 PROCEDURE — 2580000003 HC RX 258: Performed by: INTERNAL MEDICINE

## 2023-04-02 PROCEDURE — 6370000000 HC RX 637 (ALT 250 FOR IP): Performed by: NURSE PRACTITIONER

## 2023-04-02 PROCEDURE — 99232 SBSQ HOSP IP/OBS MODERATE 35: CPT | Performed by: INTERNAL MEDICINE

## 2023-04-02 RX ORDER — FERROUS SULFATE 325(65) MG
325 TABLET ORAL
Status: DISCONTINUED | OUTPATIENT
Start: 2023-04-02 | End: 2023-04-05 | Stop reason: HOSPADM

## 2023-04-02 RX ADMIN — Medication 10 ML: at 20:40

## 2023-04-02 RX ADMIN — INSULIN LISPRO 2 UNITS: 100 INJECTION, SOLUTION INTRAVENOUS; SUBCUTANEOUS at 08:40

## 2023-04-02 RX ADMIN — ASPIRIN 81 MG 81 MG: 81 TABLET ORAL at 08:39

## 2023-04-02 RX ADMIN — Medication 10 ML: at 08:39

## 2023-04-02 RX ADMIN — ENOXAPARIN SODIUM 40 MG: 100 INJECTION SUBCUTANEOUS at 08:39

## 2023-04-02 RX ADMIN — CEFTRIAXONE SODIUM 1000 MG: 1 INJECTION, POWDER, FOR SOLUTION INTRAMUSCULAR; INTRAVENOUS at 16:28

## 2023-04-02 RX ADMIN — ROSUVASTATIN 10 MG: 10 TABLET, FILM COATED ORAL at 20:41

## 2023-04-02 RX ADMIN — METOPROLOL TARTRATE 25 MG: 25 TABLET, FILM COATED ORAL at 20:41

## 2023-04-02 RX ADMIN — FERROUS SULFATE TAB 325 MG (65 MG ELEMENTAL FE) 325 MG: 325 (65 FE) TAB at 10:11

## 2023-04-02 RX ADMIN — Medication 2000 UNITS: at 08:43

## 2023-04-02 RX ADMIN — METOPROLOL TARTRATE 25 MG: 25 TABLET, FILM COATED ORAL at 08:39

## 2023-04-02 NOTE — PLAN OF CARE
Problem: Chronic Conditions and Co-morbidities  Goal: Patient's chronic conditions and co-morbidity symptoms are monitored and maintained or improved  4/2/2023 0448 by Jose Christiansen  Outcome: Progressing  4/2/2023 0447 by Jose Christiansen  Outcome: Progressing     Problem: Safety - Adult  Goal: Free from fall injury  4/2/2023 0448 by Jose Christiansen  Outcome: Progressing  4/2/2023 0447 by MARIA M Brothers  Outcome: Progressing     Problem: Skin/Tissue Integrity  Goal: Absence of new skin breakdown  Description: 1. Monitor for areas of redness and/or skin breakdown  2. Assess vascular access sites hourly  3. Every 4-6 hours minimum:  Change oxygen saturation probe site  4. Every 4-6 hours:  If on nasal continuous positive airway pressure, respiratory therapy assess nares and determine need for appliance change or resting period.   4/2/2023 0448 by Josestefania Christiansen  Outcome: Progressing  4/2/2023 0447 by Jose Christiansen  Outcome: Progressing     Problem: ABCDS Injury Assessment  Goal: Absence of physical injury  4/2/2023 0448 by Josestefania Christiansen  Outcome: Progressing  4/2/2023 0447 by Josestefania Christiansen  Outcome: Progressing

## 2023-04-02 NOTE — PLAN OF CARE
Problem: Chronic Conditions and Co-morbidities  Goal: Patient's chronic conditions and co-morbidity symptoms are monitored and maintained or improved  4/2/2023 1651 by Les Jules RN  Outcome: Progressing  4/2/2023 0448 by Edenilson Pacheco  Outcome: Progressing  4/2/2023 0447 by Edenilson Pacheco  Outcome: Progressing     Problem: Safety - Adult  Goal: Free from fall injury  4/2/2023 1651 by Les Jules RN  Outcome: Progressing  4/2/2023 0448 by Edenilson Pacheco  Outcome: Progressing  4/2/2023 0447 by Edenilson Pacheco  Outcome: Progressing     Problem: Skin/Tissue Integrity  Goal: Absence of new skin breakdown  Description: 1. Monitor for areas of redness and/or skin breakdown  2. Assess vascular access sites hourly  3. Every 4-6 hours minimum:  Change oxygen saturation probe site  4. Every 4-6 hours:  If on nasal continuous positive airway pressure, respiratory therapy assess nares and determine need for appliance change or resting period.   4/2/2023 1651 by Les Jules RN  Outcome: Progressing  4/2/2023 0448 by Edenilson Pacheco  Outcome: Progressing  4/2/2023 0447 by Edenilson Pacheco  Outcome: Progressing     Problem: ABCDS Injury Assessment  Goal: Absence of physical injury  4/2/2023 1651 by Les Jules RN  Outcome: Progressing  4/2/2023 0448 by Edenilson Pacheco  Outcome: Progressing  4/2/2023 0447 by Edenilson Pacheco  Outcome: Progressing

## 2023-04-03 LAB
ANION GAP SERPL CALCULATED.3IONS-SCNC: 10 MMOL/L (ref 3–16)
BACTERIA BLD CULT ORG #2: NORMAL
BACTERIA BLD CULT: NORMAL
BASOPHILS # BLD: 0 K/UL (ref 0–0.2)
BASOPHILS NFR BLD: 0.4 %
BUN SERPL-MCNC: 21 MG/DL (ref 7–20)
CALCIUM SERPL-MCNC: 9.1 MG/DL (ref 8.3–10.6)
CHLORIDE SERPL-SCNC: 101 MMOL/L (ref 99–110)
CO2 SERPL-SCNC: 26 MMOL/L (ref 21–32)
CREAT SERPL-MCNC: 1.4 MG/DL (ref 0.6–1.2)
DEPRECATED RDW RBC AUTO: 15.3 % (ref 12.4–15.4)
EOSINOPHIL # BLD: 0.1 K/UL (ref 0–0.6)
EOSINOPHIL NFR BLD: 1.9 %
GFR SERPLBLD CREATININE-BSD FMLA CKD-EPI: 36 ML/MIN/{1.73_M2}
GLUCOSE BLD-MCNC: 170 MG/DL (ref 70–99)
GLUCOSE BLD-MCNC: 182 MG/DL (ref 70–99)
GLUCOSE BLD-MCNC: 191 MG/DL (ref 70–99)
GLUCOSE BLD-MCNC: 191 MG/DL (ref 70–99)
GLUCOSE BLD-MCNC: 201 MG/DL (ref 70–99)
GLUCOSE SERPL-MCNC: 184 MG/DL (ref 70–99)
HCT VFR BLD AUTO: 28.4 % (ref 36–48)
HGB BLD-MCNC: 9 G/DL (ref 12–16)
LYMPHOCYTES # BLD: 1.3 K/UL (ref 1–5.1)
LYMPHOCYTES NFR BLD: 22.2 %
MAGNESIUM SERPL-MCNC: 1.7 MG/DL (ref 1.8–2.4)
MCH RBC QN AUTO: 28.5 PG (ref 26–34)
MCHC RBC AUTO-ENTMCNC: 31.6 G/DL (ref 31–36)
MCV RBC AUTO: 90.3 FL (ref 80–100)
MONOCYTES # BLD: 0.8 K/UL (ref 0–1.3)
MONOCYTES NFR BLD: 13.9 %
NEUTROPHILS # BLD: 3.5 K/UL (ref 1.7–7.7)
NEUTROPHILS NFR BLD: 61.6 %
PERFORMED ON: ABNORMAL
PLATELET # BLD AUTO: 186 K/UL (ref 135–450)
PMV BLD AUTO: 8 FL (ref 5–10.5)
POTASSIUM SERPL-SCNC: 4.2 MMOL/L (ref 3.5–5.1)
RBC # BLD AUTO: 3.15 M/UL (ref 4–5.2)
SODIUM SERPL-SCNC: 137 MMOL/L (ref 136–145)
WBC # BLD AUTO: 5.7 K/UL (ref 4–11)

## 2023-04-03 PROCEDURE — 36415 COLL VENOUS BLD VENIPUNCTURE: CPT

## 2023-04-03 PROCEDURE — 80048 BASIC METABOLIC PNL TOTAL CA: CPT

## 2023-04-03 PROCEDURE — 6370000000 HC RX 637 (ALT 250 FOR IP): Performed by: INTERNAL MEDICINE

## 2023-04-03 PROCEDURE — 97116 GAIT TRAINING THERAPY: CPT

## 2023-04-03 PROCEDURE — 94680 O2 UPTK RST&XERS DIR SIMPLE: CPT

## 2023-04-03 PROCEDURE — 83735 ASSAY OF MAGNESIUM: CPT

## 2023-04-03 PROCEDURE — 2580000003 HC RX 258: Performed by: INTERNAL MEDICINE

## 2023-04-03 PROCEDURE — 85025 COMPLETE CBC W/AUTO DIFF WBC: CPT

## 2023-04-03 PROCEDURE — 2700000000 HC OXYGEN THERAPY PER DAY

## 2023-04-03 PROCEDURE — 6360000002 HC RX W HCPCS: Performed by: INTERNAL MEDICINE

## 2023-04-03 PROCEDURE — 1200000000 HC SEMI PRIVATE

## 2023-04-03 PROCEDURE — 99233 SBSQ HOSP IP/OBS HIGH 50: CPT | Performed by: INTERNAL MEDICINE

## 2023-04-03 PROCEDURE — 94760 N-INVAS EAR/PLS OXIMETRY 1: CPT

## 2023-04-03 PROCEDURE — 6370000000 HC RX 637 (ALT 250 FOR IP): Performed by: NURSE PRACTITIONER

## 2023-04-03 PROCEDURE — 99233 SBSQ HOSP IP/OBS HIGH 50: CPT | Performed by: NURSE PRACTITIONER

## 2023-04-03 PROCEDURE — 97530 THERAPEUTIC ACTIVITIES: CPT

## 2023-04-03 RX ORDER — FLUTICASONE PROPIONATE 50 MCG
1 SPRAY, SUSPENSION (ML) NASAL DAILY
Status: DISCONTINUED | OUTPATIENT
Start: 2023-04-04 | End: 2023-04-05 | Stop reason: HOSPADM

## 2023-04-03 RX ORDER — MAGNESIUM SULFATE 1 G/100ML
1000 INJECTION INTRAVENOUS ONCE
Status: COMPLETED | OUTPATIENT
Start: 2023-04-03 | End: 2023-04-03

## 2023-04-03 RX ADMIN — Medication 10 ML: at 07:51

## 2023-04-03 RX ADMIN — ENOXAPARIN SODIUM 40 MG: 100 INJECTION SUBCUTANEOUS at 07:51

## 2023-04-03 RX ADMIN — ASPIRIN 81 MG 81 MG: 81 TABLET ORAL at 07:50

## 2023-04-03 RX ADMIN — CEFTRIAXONE SODIUM 1000 MG: 1 INJECTION, POWDER, FOR SOLUTION INTRAMUSCULAR; INTRAVENOUS at 12:58

## 2023-04-03 RX ADMIN — Medication 10 ML: at 20:06

## 2023-04-03 RX ADMIN — METOPROLOL TARTRATE 25 MG: 25 TABLET, FILM COATED ORAL at 20:06

## 2023-04-03 RX ADMIN — MAGNESIUM SULFATE HEPTAHYDRATE 1000 MG: 1 INJECTION, SOLUTION INTRAVENOUS at 08:03

## 2023-04-03 RX ADMIN — Medication 2000 UNITS: at 07:50

## 2023-04-03 RX ADMIN — FERROUS SULFATE TAB 325 MG (65 MG ELEMENTAL FE) 325 MG: 325 (65 FE) TAB at 07:50

## 2023-04-03 RX ADMIN — ROSUVASTATIN 10 MG: 10 TABLET, FILM COATED ORAL at 20:06

## 2023-04-03 NOTE — PLAN OF CARE
Problem: Safety - Adult  Goal: Free from fall injury  4/2/2023 2046 by Denise Buckley RN  Outcome: Progressing     Problem: Skin/Tissue Integrity  Goal: Absence of new skin breakdown  Description: 1. Monitor for areas of redness and/or skin breakdown  2. Assess vascular access sites hourly  3. Every 4-6 hours minimum:  Change oxygen saturation probe site  4. Every 4-6 hours:  If on nasal continuous positive airway pressure, respiratory therapy assess nares and determine need for appliance change or resting period. 4/2/2023 2046 by Denise Buckley RN  Outcome: Progressing     Problem: ABCDS Injury Assessment  Goal: Absence of physical injury  4/2/2023 2046 by Denise Buckley RN  Outcome: Progressing   Will continue to monitor and care per plan protocol.

## 2023-04-03 NOTE — DISCHARGE INSTR - COC
Continuity of Care Form    Patient Name: Chula Mota   :  1935  MRN:  [id number]    Admit date:  3/30/2023  Discharge date:  ***    Code Status Order: Full Code   Advance Directives:     Admitting Physician:  Sergio Ge MD  PCP: Melody Burdick    Discharging Nurse: [facility] Unit/Room#: [id number]-0628/9027-83  Discharging Unit Phone Number: ***    Emergency Contact:   Extended Emergency Contact Information  Primary Emergency Contact: Maryuri Forman   [address] Phone: [phone]  Work Phone: [phone]  Relation: Child  Secondary Emergency Contact: [place] [address] Phone: [phone]  Work Phone: [phone]  Relation: Child    Past Surgical History:  Past Surgical History:   Procedure Laterality Date    BACK SURGERY      BREAST SURGERY      CARDIAC SURGERY      STENTS X3    CARPAL TUNNEL RELEASE Right     CHOLECYSTECTOMY, LAPAROSCOPIC      CORONARY ARTERY BYPASS GRAFT  02/15/2011    EYE SURGERY      KNEE SURGERY      right    ULNAR TUNNEL RELEASE Right     repeat with better relief/nerve moved       Immunization History:   Immunization History   Administered Date(s) Administered    COVID-19, MODERNA BLUE border, Primary or Immunocompromised, (age 12y+), IM, 100 mcg/0.5mL 2021, 2021    Influenza Virus Vaccine 10/11/2010, 10/03/2011       Active Problems:  Patient Active Problem List   Diagnosis Code    CAD (coronary artery disease) I25.10    MI (myocardial infarction) in past I21.9    GERD (gastroesophageal reflux disease) K21.9    Diabetes type 2, uncontrolled [id number]    HTN (hypertension) I10    Hyperlipidemia E78.5    S/P CABG (coronary artery bypass graft) Z95.1    Venous insufficiency I87.2    Lightheadedness R42    Morbid obesity due to excess calories (HCC) E66.01    Abnormal urinalysis R82.90    Abnormal EKG R94.31    Primary osteoarthritis of left knee M17.12    Swelling R60.9    SOB (shortness of breath) R06.02

## 2023-04-04 LAB
ANION GAP SERPL CALCULATED.3IONS-SCNC: 10 MMOL/L (ref 3–16)
BASOPHILS # BLD: 0 K/UL (ref 0–0.2)
BASOPHILS NFR BLD: 0.6 %
BUN SERPL-MCNC: 24 MG/DL (ref 7–20)
CALCIUM SERPL-MCNC: 8.9 MG/DL (ref 8.3–10.6)
CHLORIDE SERPL-SCNC: 101 MMOL/L (ref 99–110)
CO2 SERPL-SCNC: 26 MMOL/L (ref 21–32)
CREAT SERPL-MCNC: 1.5 MG/DL (ref 0.6–1.2)
DEPRECATED RDW RBC AUTO: 15 % (ref 12.4–15.4)
EOSINOPHIL # BLD: 0.1 K/UL (ref 0–0.6)
EOSINOPHIL NFR BLD: 2 %
GFR SERPLBLD CREATININE-BSD FMLA CKD-EPI: 33 ML/MIN/{1.73_M2}
GLUCOSE BLD-MCNC: 166 MG/DL (ref 70–99)
GLUCOSE BLD-MCNC: 182 MG/DL (ref 70–99)
GLUCOSE BLD-MCNC: 199 MG/DL (ref 70–99)
GLUCOSE BLD-MCNC: 246 MG/DL (ref 70–99)
GLUCOSE BLD-MCNC: 294 MG/DL (ref 70–99)
GLUCOSE SERPL-MCNC: 183 MG/DL (ref 70–99)
HCT VFR BLD AUTO: 29.1 % (ref 36–48)
HGB BLD-MCNC: 9.3 G/DL (ref 12–16)
LV EF: 46 %
LVEF MODALITY: NORMAL
LYMPHOCYTES # BLD: 1.4 K/UL (ref 1–5.1)
LYMPHOCYTES NFR BLD: 23.4 %
MAGNESIUM SERPL-MCNC: 1.8 MG/DL (ref 1.8–2.4)
MCH RBC QN AUTO: 28.7 PG (ref 26–34)
MCHC RBC AUTO-ENTMCNC: 32 G/DL (ref 31–36)
MCV RBC AUTO: 89.9 FL (ref 80–100)
MONOCYTES # BLD: 0.8 K/UL (ref 0–1.3)
MONOCYTES NFR BLD: 13.7 %
NEUTROPHILS # BLD: 3.7 K/UL (ref 1.7–7.7)
NEUTROPHILS NFR BLD: 60.3 %
PERFORMED ON: ABNORMAL
PLATELET # BLD AUTO: 199 K/UL (ref 135–450)
PMV BLD AUTO: 8 FL (ref 5–10.5)
POTASSIUM SERPL-SCNC: 4.4 MMOL/L (ref 3.5–5.1)
RBC # BLD AUTO: 3.24 M/UL (ref 4–5.2)
SODIUM SERPL-SCNC: 137 MMOL/L (ref 136–145)
WBC # BLD AUTO: 6.2 K/UL (ref 4–11)

## 2023-04-04 PROCEDURE — 3430000000 HC RX DIAGNOSTIC RADIOPHARMACEUTICAL: Performed by: NURSE PRACTITIONER

## 2023-04-04 PROCEDURE — 97535 SELF CARE MNGMENT TRAINING: CPT

## 2023-04-04 PROCEDURE — A9502 TC99M TETROFOSMIN: HCPCS | Performed by: NURSE PRACTITIONER

## 2023-04-04 PROCEDURE — 85025 COMPLETE CBC W/AUTO DIFF WBC: CPT

## 2023-04-04 PROCEDURE — 2700000000 HC OXYGEN THERAPY PER DAY

## 2023-04-04 PROCEDURE — 97530 THERAPEUTIC ACTIVITIES: CPT

## 2023-04-04 PROCEDURE — 6360000002 HC RX W HCPCS: Performed by: INTERNAL MEDICINE

## 2023-04-04 PROCEDURE — 99233 SBSQ HOSP IP/OBS HIGH 50: CPT | Performed by: INTERNAL MEDICINE

## 2023-04-04 PROCEDURE — 1200000000 HC SEMI PRIVATE

## 2023-04-04 PROCEDURE — 78452 HT MUSCLE IMAGE SPECT MULT: CPT | Performed by: INTERNAL MEDICINE

## 2023-04-04 PROCEDURE — 36415 COLL VENOUS BLD VENIPUNCTURE: CPT

## 2023-04-04 PROCEDURE — 93017 CV STRESS TEST TRACING ONLY: CPT | Performed by: INTERNAL MEDICINE

## 2023-04-04 PROCEDURE — 94760 N-INVAS EAR/PLS OXIMETRY 1: CPT

## 2023-04-04 PROCEDURE — 2580000003 HC RX 258: Performed by: INTERNAL MEDICINE

## 2023-04-04 PROCEDURE — 80048 BASIC METABOLIC PNL TOTAL CA: CPT

## 2023-04-04 PROCEDURE — 6370000000 HC RX 637 (ALT 250 FOR IP): Performed by: NURSE PRACTITIONER

## 2023-04-04 PROCEDURE — 6370000000 HC RX 637 (ALT 250 FOR IP): Performed by: INTERNAL MEDICINE

## 2023-04-04 PROCEDURE — 6370000000 HC RX 637 (ALT 250 FOR IP): Performed by: PHYSICIAN ASSISTANT

## 2023-04-04 PROCEDURE — 99232 SBSQ HOSP IP/OBS MODERATE 35: CPT | Performed by: INTERNAL MEDICINE

## 2023-04-04 PROCEDURE — 83735 ASSAY OF MAGNESIUM: CPT

## 2023-04-04 RX ORDER — ENOXAPARIN SODIUM 100 MG/ML
30 INJECTION SUBCUTANEOUS DAILY
Status: DISCONTINUED | OUTPATIENT
Start: 2023-04-04 | End: 2023-04-05 | Stop reason: HOSPADM

## 2023-04-04 RX ORDER — AMINOPHYLLINE DIHYDRATE 25 MG/ML
100 INJECTION, SOLUTION INTRAVENOUS ONCE
Status: COMPLETED | OUTPATIENT
Start: 2023-04-04 | End: 2023-04-04

## 2023-04-04 RX ADMIN — FLUTICASONE PROPIONATE 1 SPRAY: 50 SPRAY, METERED NASAL at 00:02

## 2023-04-04 RX ADMIN — ENOXAPARIN SODIUM 30 MG: 100 INJECTION SUBCUTANEOUS at 10:40

## 2023-04-04 RX ADMIN — Medication 10 ML: at 10:56

## 2023-04-04 RX ADMIN — METOPROLOL TARTRATE 25 MG: 25 TABLET, FILM COATED ORAL at 21:46

## 2023-04-04 RX ADMIN — ASPIRIN 81 MG 81 MG: 81 TABLET ORAL at 10:40

## 2023-04-04 RX ADMIN — Medication 2000 UNITS: at 10:40

## 2023-04-04 RX ADMIN — AMINOPHYLLINE 100 MG: 25 INJECTION, SOLUTION INTRAVENOUS at 08:51

## 2023-04-04 RX ADMIN — TETROFOSMIN 30 MILLICURIE: 1.38 INJECTION, POWDER, LYOPHILIZED, FOR SOLUTION INTRAVENOUS at 08:50

## 2023-04-04 RX ADMIN — Medication 10 ML: at 21:46

## 2023-04-04 RX ADMIN — ROSUVASTATIN 10 MG: 10 TABLET, FILM COATED ORAL at 21:45

## 2023-04-04 RX ADMIN — FERROUS SULFATE TAB 325 MG (65 MG ELEMENTAL FE) 325 MG: 325 (65 FE) TAB at 10:40

## 2023-04-04 RX ADMIN — REGADENOSON 0.4 MG: 0.08 INJECTION, SOLUTION INTRAVENOUS at 08:46

## 2023-04-04 RX ADMIN — TETROFOSMIN 10 MILLICURIE: 1.38 INJECTION, POWDER, LYOPHILIZED, FOR SOLUTION INTRAVENOUS at 07:49

## 2023-04-04 RX ADMIN — METOPROLOL TARTRATE 25 MG: 25 TABLET, FILM COATED ORAL at 10:40

## 2023-04-04 RX ADMIN — CEFTRIAXONE SODIUM 1000 MG: 1 INJECTION, POWDER, FOR SOLUTION INTRAMUSCULAR; INTRAVENOUS at 14:03

## 2023-04-05 VITALS
BODY MASS INDEX: 34.19 KG/M2 | SYSTOLIC BLOOD PRESSURE: 100 MMHG | HEIGHT: 64 IN | OXYGEN SATURATION: 96 % | RESPIRATION RATE: 16 BRPM | DIASTOLIC BLOOD PRESSURE: 71 MMHG | HEART RATE: 112 BPM | WEIGHT: 200.3 LBS | TEMPERATURE: 97.9 F

## 2023-04-05 LAB
ANION GAP SERPL CALCULATED.3IONS-SCNC: 12 MMOL/L (ref 3–16)
BUN SERPL-MCNC: 29 MG/DL (ref 7–20)
CALCIUM SERPL-MCNC: 8.8 MG/DL (ref 8.3–10.6)
CHLORIDE SERPL-SCNC: 104 MMOL/L (ref 99–110)
CO2 SERPL-SCNC: 24 MMOL/L (ref 21–32)
CREAT SERPL-MCNC: 1.4 MG/DL (ref 0.6–1.2)
GFR SERPLBLD CREATININE-BSD FMLA CKD-EPI: 36 ML/MIN/{1.73_M2}
GLUCOSE BLD-MCNC: 156 MG/DL (ref 70–99)
GLUCOSE BLD-MCNC: 163 MG/DL (ref 70–99)
GLUCOSE BLD-MCNC: 189 MG/DL (ref 70–99)
GLUCOSE BLD-MCNC: 276 MG/DL (ref 70–99)
GLUCOSE SERPL-MCNC: 174 MG/DL (ref 70–99)
MAGNESIUM SERPL-MCNC: 1.8 MG/DL (ref 1.8–2.4)
PERFORMED ON: ABNORMAL
PHOSPHATE SERPL-MCNC: 3.9 MG/DL (ref 2.5–4.9)
POTASSIUM SERPL-SCNC: 4.1 MMOL/L (ref 3.5–5.1)
SODIUM SERPL-SCNC: 140 MMOL/L (ref 136–145)

## 2023-04-05 PROCEDURE — 97530 THERAPEUTIC ACTIVITIES: CPT

## 2023-04-05 PROCEDURE — 6360000002 HC RX W HCPCS: Performed by: INTERNAL MEDICINE

## 2023-04-05 PROCEDURE — 97116 GAIT TRAINING THERAPY: CPT

## 2023-04-05 PROCEDURE — 6370000000 HC RX 637 (ALT 250 FOR IP): Performed by: INTERNAL MEDICINE

## 2023-04-05 PROCEDURE — 2580000003 HC RX 258: Performed by: INTERNAL MEDICINE

## 2023-04-05 PROCEDURE — 84100 ASSAY OF PHOSPHORUS: CPT

## 2023-04-05 PROCEDURE — 83735 ASSAY OF MAGNESIUM: CPT

## 2023-04-05 PROCEDURE — 94680 O2 UPTK RST&XERS DIR SIMPLE: CPT

## 2023-04-05 PROCEDURE — 99233 SBSQ HOSP IP/OBS HIGH 50: CPT | Performed by: INTERNAL MEDICINE

## 2023-04-05 PROCEDURE — 80048 BASIC METABOLIC PNL TOTAL CA: CPT

## 2023-04-05 PROCEDURE — 99232 SBSQ HOSP IP/OBS MODERATE 35: CPT | Performed by: INTERNAL MEDICINE

## 2023-04-05 RX ORDER — FERROUS SULFATE 325(65) MG
325 TABLET ORAL
Qty: 30 TABLET | Refills: 2 | Status: SHIPPED | OUTPATIENT
Start: 2023-04-05

## 2023-04-05 RX ORDER — CHOLECALCIFEROL (VITAMIN D3) 50 MCG
2000 TABLET ORAL DAILY
Qty: 30 TABLET | Refills: 1 | Status: SHIPPED | OUTPATIENT
Start: 2023-04-05

## 2023-04-05 RX ADMIN — METOPROLOL TARTRATE 25 MG: 25 TABLET, FILM COATED ORAL at 11:13

## 2023-04-05 RX ADMIN — INSULIN LISPRO 4 UNITS: 100 INJECTION, SOLUTION INTRAVENOUS; SUBCUTANEOUS at 13:30

## 2023-04-05 RX ADMIN — FERROUS SULFATE TAB 325 MG (65 MG ELEMENTAL FE) 325 MG: 325 (65 FE) TAB at 11:14

## 2023-04-05 RX ADMIN — Medication 2000 UNITS: at 11:14

## 2023-04-05 RX ADMIN — ENOXAPARIN SODIUM 30 MG: 100 INJECTION SUBCUTANEOUS at 11:19

## 2023-04-05 RX ADMIN — FLUTICASONE PROPIONATE 1 SPRAY: 50 SPRAY, METERED NASAL at 11:19

## 2023-04-05 RX ADMIN — Medication 10 ML: at 11:13

## 2023-04-05 RX ADMIN — ASPIRIN 81 MG 81 MG: 81 TABLET ORAL at 11:14

## 2023-04-05 NOTE — DISCHARGE SUMMARY
dose correction  - Resumed home regimen on DC     Vitamin D deficiency  - Vitamin D level 19   - Started on supplementation    ? Hypoxia  - Patient was placed on O2 for documented O2 sat 87%  - No history lung disease  - CXR with no acute findings  - Question accuracy of oximetry reading  - Home O2 eval completed. Spoke with respiratory therapist, sats mid 90s on room air and stayed mid 90s on ambulation in [place]. No indication for home O2      Patient reports she feels better, eager for DC home. Reviewed DC plans, follow up and medications. Expresses understanding. Questions answered. Consults. IP CONSULT TO CARDIOLOGY  IP CONSULT TO NEPHROLOGY  IP CONSULT TO HOME CARE NEEDS    Physical examination on discharge day. /71   Pulse 82   Temp 97.4 °F (36.3 °C) (Oral)   Resp 16   Ht 5' 4\" (1.626 m)   Wt 200 lb 4.8 oz (90.9 kg)   SpO2 91%   BMI 34.38 kg/m²   General appearance. Alert. Looks comfortable. HEENT. Sclera clear. Moist mucus membranes. Cardiovascular. Regular rate and rhythm, normal S1, S2. No murmur. Respiratory. Not using accessory muscles. Clear to auscultation bilaterally, no wheeze. Gastrointestinal. Abdomen soft, non-tender, not distended, normal bowel sounds  Neurology. Facial symmetry. No speech deficits. Moving all extremities equally. Extremities. No edema in lower extremities. Skin. Warm, dry, normal turgor    Condition at time of discharge stable    Medication instructions provided to patient at discharge.      Medication List        START taking these medications      ferrous sulfate 325 (65 Fe) MG tablet  Commonly known as: IRON 325  Take 1 tablet by mouth daily (with breakfast)     vitamin D 50 MCG (2000 UT) Tabs tablet  Commonly known as: CHOLECALCIFEROL  Take 1 tablet by mouth daily            CONTINUE taking these medications      aspirin 81 MG chewable tablet     clopidogrel 75 MG tablet  Commonly known as: PLAVIX     cyanocobalamin 1000 MCG/ML injection (2) well flexed

## 2023-04-05 NOTE — DISCHARGE INSTRUCTIONS
Please follow all take all medications as prescribed, and keep all future doctor's appointments. [facility] will be contacting you within 24 hours to set up services.     [address]  [place], [address] [place] [address]  Phone: [phone]  Fax: [phone]

## 2023-04-05 NOTE — PROGRESS NOTES
04/03/23 1600   Resting (Room Air)   SpO2 87   Resting (On O2)   SpO2 94   O2 Device Nasal cannula   O2 Flow Rate (l/min) 2 l/min   During Walk (On O2)   SpO2 92   O2 Device Nasal cannula   O2 Flow Rate (l/min) 3 l/min   Walk/Assistance Device Walker   Rate of Dyspnea 0   Comments On Ra pt sats 87%, 1L 88, 2L 90%. On exertion with 2L pt sats dropped to 88%, increased to 3L sats 91-94% through out the remainder of walk. NO SOB or WOB.    After Walk   Does the Patient Qualify for Home O2 Yes   Liter Flow at Rest 2   Liter Flow on Exertion 3   Does the Patient Need Portable Oxygen Tanks Yes
04/05/23 1256   Resting (Room Air)   SpO2 94   HR 83   Resting (On O2)   Comments The pt was on room air and did not need O2   During Walk (Room Air)   SpO2 95   HR 86   Walk/Assistance Device Walker   Rate of Dyspnea 0   Comments The pt did not need O2 and did not get SOB   After Walk   SpO2 96   HR 87   Rate of Dyspnea 0   Comments The pt was on room air at rest and while the pt was ambulating with a walker the pt did not desat and did not require O2.    Does the Patient Qualify for Home O2 No   Does the Patient Need Portable Oxygen Tanks No
Aminophylline given per lexiscan protocol in stress lab for c/o persistant nausea.
Assessment complete. VSS. Patient awake in bed. Respirations even and easy. Call light in reach. Fall precautions in place. No needs expressed at this time. Will continue to monitor.
Assessment complete. VSS. Patient resting in bed. Respirations even and easy. Call light in reach. Fall precautions in place. No needs expressed at this time.
[address]   Cardiology Progress Note     Date: 4/3/2023  Admit Date: 3/30/2023     Reason for consultation:     Chief Complaint:   Chief Complaint   Patient presents with    Dizziness     Pt woke up this morning to go to bathroom, was dizzy and lightheaded. Waited for day light and called daughter. History of Present Illness: History obtained from patient and medical record. Enirque Fine is a 80 y.o. female who presented to the hospital with complaints of dizziness. She was also recently seen by me and had some complaints of shortness of breath and leg swelling. No complaints of chest discomfort. (per consult note)    Interval Hx: Today, she is feeling ok. States her breathing feels back to her normal. However, remains on 2L of oxygen. Saturation 95% while in room. No chest pain. Tele SR, PVCs. Denies CP. No further dizziness. Has LE edema that pt feels is at her normal. Walking around the room without exertional symptoms. Pt recalls she felt SOB prior to CABG in '11. Patient seen and examined. Clinical notes reviewed. Telemetry reviewed. No new complaints today. No major events overnight. Denies having chest pain, palpitations, shortness of breath, orthopnea/PND, cough, or dizziness at the time of this visit. Past Medical History:  Past Medical History:   Diagnosis Date    Acute MI ([address]     Arthritis     hands    CAD (coronary artery disease)     stents    Diabetes mellitus ([address]     GERD (gastroesophageal reflux disease)     History of blood transfusion     R/T previous surgeries    Hyperlipidemia     Hypertension     Nausea & vomiting         Past Surgical History:    has a past surgical history that includes Cardiac surgery; knee surgery; back surgery; Cholecystectomy, laparoscopic; eye surgery; Breast surgery; Coronary artery bypass graft (02/15/2011); Carpal tunnel release (Right); and Ulnar tunnel release (Right). Social History:  Reviewed.   reports that she quit
Cardiovascular Progress Note      Chief Complaint:   Chief Complaint   Patient presents with    Dizziness     Pt woke up this morning to go to bathroom, was dizzy and lightheaded. Waited for day light and called daughter. Impression/Recommendations:    SOB, resolved  Dizziness, likely 2/2 UTI/hypotension, resolved  CAD s/p CABG x2 (LIMA-LAD, SVG-PDA) 2/2011 Dr. Ahsan Cee, [address] 4/2023 without reversible ischemia   Hypertension  Hyperlipidemia   DM, A1C 7.9  PAOLA on CKD stage III, Nephrology on board  Former smoker  LE venous insufficiency       Vester Cheadle is a patient of Dr. Neftali Suero, recently reestablished in the office. Echo with known apical hypokinesis, normal EF since 2011 CABG. Ewa Lloyd without reversible ischemia and fixed defect in apex, similar to 2016 SPECT. Denies chest pain or anginal concern (recalls at time of CABG as nausea/sick to her stomach). Increased PVCs could be scar related. Increase beta blocker at this time, with Holter upon discharge. Interval History:  No events overnight. Sitting up on room air in chair. No exertional dyspnea with walker. No cough. No chest pain today. Lower extremity stockings. Lives alone and youngest daughter lives less than 1 mile away and manages her medications. Doesn't have hearing aids today but shows good understanding. Frequent PVCs noted on telemetry. CT Head 3/30/23  Impression   No acute intracranial abnormality. CXR 3/30/23  Impression   No acute disease     ECG 3/30/23  Sinus rhythm with Premature atrial complexes  Cannot rule out Anterior infarct (cited on or before 06-JAN-2022)  ST & T wave abnormality, consider lateral ischemia      Echo 3/31/23:  Summary  Definity was used to assist with endocardial border delineation. Mild concentric left ventricular hypertrophy. Normal left ventricular cavity size and systolic function with an estimated  ejection fraction of 55%.   The left ventricular apex appears akinetic and mildly
EKG done and placed in patient chart.
[facility] Department   Phone: [phone]    Physical Therapy    [] Initial Evaluation            [x] Daily Treatment Note         [] Discharge Summary      Patient: Juice Corley   : 1935   MRN: [id number]   Date of Service:  4/3/2023  Admitting Diagnosis: UTI (urinary tract infection)  Current Admission Summary: Juice Corley is a 80 y.o. female with a history of hypertension, hyperlipidemia, CAD, MI and diabetes who presents to the emergency department today with her daughter stating she woke up this morning around 5 AM to go to the bathroom she felt lightheaded and states she almost did not make it to the bathroom. She states she went to bed around 10 PM last night and felt fine. She is alert and oriented x2 to person and place on arrival.  Her daughter states it is very normal for her to not know the day of the week or the month as \"she does not write checks or anything like that\". Daughter states her mental status is completely at baseline. Daughter states it is very normal for her to experience lightheadedness similar to this when she has a urinary tract infection. She has also had slightly increasing lower extremity edema and just saw her cardiologist and is scheduled for an echocardiogram.     Past Medical History:  has a past medical history of Acute MI ([address] Arthritis, CAD (coronary artery disease), Diabetes mellitus ([address] GERD (gastroesophageal reflux disease), History of blood transfusion, Hyperlipidemia, Hypertension, and Nausea & vomiting. Past Surgical History:  has a past surgical history that includes Cardiac surgery; knee surgery; back surgery; Cholecystectomy, laparoscopic; eye surgery; Breast surgery; Coronary artery bypass graft (02/15/2011); Carpal tunnel release (Right); and Ulnar tunnel release (Right). Discharge Recommendations: Juice Corley scored a 18/24 on the AM-PAC short mobility form.  Current research shows that an AM-PAC score
HOSPITALISTS PROGRESS NOTE    2023 9:45 AM        Name: Amber Yañez . Admitted: 3/30/2023  Primary Care Provider: Nori Urban (Tel: [phone])      Brief Course:  80 y.o. female  with PMHx of CAD;s/p CABG , hypertension, hyperlipidemia, diabetes mellitus and arthritis presented with dizziness. CT head on admission negative for any acute intracranial pathology. Lab work showed ma.3.  UA suggest UTI. Interval history:   Pt seen and examined today. Overnight events noted, interval ancillary notes and labs reviewed. Plan overnight, WBC WNL, blood cultures negative to date  Mag this morning; replacement ordered. Creatinine 1.3 this morning  Up in chair; denied any new complaint  Plan for Lexiscan Myoview stress test for further ischemic evaluation. Assessment & Plan:     Dizziness; Likely 2/2 generalized weakness/UTI/hypotension; arrhythmias  CT head negative for any acute pathology  Orthostatic vitals negative. Echo showed EF of 55%. 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 G1 DD with normal LV pressure. Vitamin Q80, folic acid, TSH WNL. Monitor on telemetry for any signs of arrhythmias    Hx of CAD; s/p CABG ; stable  Echo on 3/31/23 showed apical wall motion abnormality concerning for prior infarct. Cardiology on board; pending for excision Myoview stress test on Monday  Continue aspirin, Plavix, beta-blockers and statins    Hypertension: Continue beta-blocker and monitor BP closely     UTI; urine culture growing> 100,000 E. coli. Continue ceftriaxone     Hypomagnesemia; replacement ordered. Monitor and replace electrolyte closely     Normocytic anemia; hemoglobin 10.3 on admission  Vitamin K62, folic acid, iron studies ordered. Monitor CBC closely     Diabetes mellitus; hold oral antidiabetic medication  Hemoglobin A1c 7.9 on admission.   Continue SSI
HOSPITALISTS PROGRESS NOTE    4/3/2023 7:36 AM        Name: Arturo Trejo . Admitted: 3/30/2023  Primary Care Provider: Azalee Hodgkins (Tel: [phone])      Brief Course:  80 y.o. female  with PMHx of CAD;s/p CABG , hypertension, hyperlipidemia, diabetes mellitus and arthritis presented with dizziness. CT head on admission negative for any acute intracranial pathology. Lab work showed ma.3.  UA suggest UTI. Interval history:   Pt seen and examined today. Overnight events noted, interval ancillary notes and labs reviewed. Creatinine remained around 1.4  denied any fever, chills, dizziness, SOB, chest pain, patient nausea, vomiting or abdominal pain  Plan for Lexiscan Myoview stress test for further ischemic evaluation. Assessment & Plan:     Dizziness; Likely 2/2 UTI/hypotension; resolved  Orthostatic vitals negative. CT head negative for any acute pathology  Echo showed EF of 55%. 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 G1 DD with normal LV pressure. Monitor on telemetry for any signs of arrhythmias    Hx of CAD; s/p CABG ;   Echo on 3/31/23 showed apical wall motion abnormality concerning for prior infarct. Cardiology on board; plan for Myoview stress test on Monday  Continue aspirin, Plavix, beta-blockers and statins    Hypertension: Continue beta-blocker and monitor BP closely  HCTZ and losartan discontinued     UTI; urine culture growing> 100,000 E. coli. Completed Rocephin course     Hypomagnesemia; replacement ordered. Monitor and replace electrolyte closely    PAOLA on CKD stage IIIb; Creatinine 1.4 admission;(baseline around 1)  Nephrology on board; HCTZ and valsartan discontinued  Avoid nephrotoxins, strict intake output, daily weights and monitor renal function closely     Normocytic anemia; hemoglobin 10.3 on admission  Vitamin R70, folic acid WNL.   Iron
Instructed on sitting Lexiscan Stress Test Procedure including possible side effects/ adverse reactions. Patient verbalizes  understanding and denies having any questions. See [address] [place] [address] Rd Cardiology.   Marc Bush RN
Just spoke with Dr. Angie Adams want to start weaning pt off of oxygen since she does not use oxygen at home. Checked pt she was at 97% at 2L, put her down to 1L. I left her on pulse ox at bedside.  also wants a stress test done on Monday and recheck Mag. She said she will put in orders.
Occupational Therapy  Aicha Mendieta    Attempted to see pt for OT treatment session, however, pt is currently BAIRON for further testing. Will continue to monitor pt and check back as time permits and schedule allows.     Thanks,  Frankey Ambrosia, OTR/L [id number]
Office : [phone]     Fax :[phone]       Nephrology  progress  Note      Patient's Name: Jessica Pierre  9:32 AM  4/3/2023    Reason for Consult:  PAOLA on CKD       Requesting Physician:  Pershing Carrel      Chief Complaint:    Chief Complaint   Patient presents with    Dizziness     Pt woke up this morning to go to bathroom, was dizzy and lightheaded. Waited for day light and called daughter. History of Present iIlness:    Jessica Pierre is a 80 y.o. female with prior history of CAD, HTN was admitted with c/o Dizziness and weakness     Initial lab work shows elevated creat of 1.4   No SOB    Interval hx     Feels better   No SOB   No edema     Sat 92 % 0n 2 L    No intake/output data recorded. Past Medical History:   Diagnosis Date    Acute MI ([address]     Arthritis     hands    CAD (coronary artery disease)     stents    Diabetes mellitus ([address]     GERD (gastroesophageal reflux disease)     History of blood transfusion     R/T previous surgeries    Hyperlipidemia     Hypertension     Nausea & vomiting        Past Surgical History:   Procedure Laterality Date    BACK SURGERY      BREAST SURGERY      CARDIAC SURGERY      STENTS X3    CARPAL TUNNEL RELEASE Right     CHOLECYSTECTOMY, LAPAROSCOPIC      CORONARY ARTERY BYPASS GRAFT  02/15/2011    EYE SURGERY      KNEE SURGERY      right    ULNAR TUNNEL RELEASE Right     repeat with better relief/nerve moved       Family History   Problem Relation Age of Onset    Diabetes Mother     Heart Disease Mother     Other Grandchild         VonWillebrand    Anesth Problems Neg Hx     Malig Hyperten Neg Hx     Hypotension Neg Hx     Malig Hypertherm Neg Hx     Pseudochol.  Deficiency Neg Hx     High Blood Pressure Neg Hx     High Cholesterol
Office : [phone]     Fax :[phone]       Nephrology  progress  Note      Patient's Name: Domenica Aguirre  9:46 AM  4/4/2023    Reason for Consult:  PAOLA on CKD       Requesting Physician:  Portia Montero      Chief Complaint:    Chief Complaint   Patient presents with    Dizziness     Pt woke up this morning to go to bathroom, was dizzy and lightheaded. Waited for day light and called daughter. History of Present iIlness:    Domenica Aguirre is a 80 y.o. female with prior history of CAD, HTN was admitted with c/o Dizziness and weakness     Initial lab work shows elevated creat of 1.4   No SOB    Interval hx     Feels better   No SOB   No edema   BP soft  Sat 92 % 0n 2 L    I/O last 3 completed shifts:  In: -   Out: 200 [Urine:200]    Past Medical History:   Diagnosis Date    Acute [facility] ([facility]     Arthritis     hands    CAD (coronary artery disease)     stents    Diabetes mellitus (HCC)     GERD (gastroesophageal reflux disease)     History of blood transfusion     R/T previous surgeries    Hyperlipidemia     Hypertension     Nausea & vomiting        Past Surgical History:   Procedure Laterality Date    BACK SURGERY      BREAST SURGERY      CARDIAC SURGERY      STENTS X3    CARPAL TUNNEL RELEASE Right     CHOLECYSTECTOMY, LAPAROSCOPIC      CORONARY ARTERY BYPASS GRAFT  02/15/2011    EYE SURGERY      KNEE SURGERY      right    ULNAR TUNNEL RELEASE Right     repeat with better relief/nerve moved       Family History   Problem Relation Age of Onset    Diabetes Mother     Heart Disease Mother     Other Grandchild         VonWillebrand    Anesth Problems Neg Hx     Malig Hyperten Neg Hx     Hypotension Neg Hx     Malig Hypertherm Neg Hx     Pseudochol.  Deficiency Neg Hx     High Blood
Office : [phone]     Fax :[phone]       Nephrology  progress  Note      Patient's Name: Lucas Walter  7:46 PM  4/1/2023    Reason for Consult:  PAOLA on CKD       Requesting Physician:  Constance Huff      Chief Complaint:    Chief Complaint   Patient presents with    Dizziness     Pt woke up this morning to go to bathroom, was dizzy and lightheaded. Waited for day light and called daughter. History of Present iIlness:    Lucas Walter is a 80 y.o. female with prior history of CAD, HTN was admitted with c/o Dizziness and weakness     Initial lab work shows elevated creat of 1.4   No SOB    Interval hx     Feels better   No SOB   No edema       I/O last 3 completed shifts: In: 56 [P.O.:480; I.V.:10]  Out: -     Past Medical History:   Diagnosis Date    Acute [facility] ([facility]     Arthritis     hands    CAD (coronary artery disease)     stents    Diabetes mellitus ([facility]     GERD (gastroesophageal reflux disease)     History of blood transfusion     R/T previous surgeries    Hyperlipidemia     Hypertension     Nausea & vomiting        Past Surgical History:   Procedure Laterality Date    BACK SURGERY      BREAST SURGERY      CARDIAC SURGERY      STENTS X3    CARPAL TUNNEL RELEASE Right     CHOLECYSTECTOMY, LAPAROSCOPIC      CORONARY ARTERY BYPASS GRAFT  02/15/2011    EYE SURGERY      KNEE SURGERY      right    ULNAR TUNNEL RELEASE Right     repeat with better relief/nerve moved       Family History   Problem Relation Age of Onset    Diabetes Mother     Heart Disease Mother     Other Grandchild         VonWillebrand    Anesth Problems Neg Hx     Malig Hyperten Neg Hx     Hypotension Neg Hx     Malig Hypertherm Neg Hx     Pseudochol.  Deficiency Neg Hx     High Blood Pressure Neg Hx
Patient covid test negative and patient and family notified. Patients daughter tested positive today for covid but has not been around the patient. No s/s noted with patient.  Isolation discontinued
Patient had 3 beat runs of NSVT within last 3 minutes. Night NP notified. Stat labs ordered. Will continue to monitor.
Patient seen in ED, room 12. Admission initiated and completed through Belongings. All of the assessments beginning with Psychosocial will need to be completed by the nurse in addition to the 4 Eyes Assessment, Immunizations, Covid Vaccines, Rights and Responsibilities, Orientation to room, Plan of Care, Education/Learning Assessment and Education Plan, white board, height and weight, pain assessment and head to toe assessment. Patient is alert and is being admitted for UTI. Home Medication reconciliation was completed by the Hernan bailey Ohs. Plan of care updated if indicated. All questions answered.
Shift assessment completed. Routine vitals obtained. Held scheduled lopressor due to HR 69, per parameters to hold when HR is below 90. Other scheduled medications given. Patient is awake, alert and oriented. Respirations are easy and unlabored. Patient does not appear to be in distress, resting comfortably at this time. Call light within reach. Fall precautions in place. Bed alarm engaged. Bed locked and in low position. No further needs expressed.
Shift assessment completed. Routine vitals stable. Scheduled medications given. Patient is awake, alert and oriented. Respirations are easy and unlabored. Patient does not appear to be in distress. Call light within reach. Patient walked in the hallway with therapy and tolerated well.
Shift assessment completed. VSS. Scheduled meds given. Bed locked and in low position. Call light within reach. No further needs expressed at this time.
Stress test will be done on Tuesday.
Cholesterol Neg Hx         reports that she quit smoking about 30 years ago. Her smoking use included cigarettes. She has a 15.00 pack-year smoking history. She has never used smokeless tobacco. She reports that she does not drink alcohol and does not use drugs.         Allergies:  Azithromycin, Canagliflozin, Codeine, Hydrocodone-acetaminophen, Lisinopril, Tramadol, and Celecoxib    Current Medications:    enoxaparin Sodium (LOVENOX) injection 30 mg, Daily  fluticasone (FLONASE) 50 MCG/ACT nasal spray 1 spray, Daily  ferrous sulfate (IRON 325) tablet 325 mg, Daily with breakfast  rosuvastatin (CRESTOR) tablet 10 mg, Nightly  insulin lispro (HUMALOG) injection vial 0-8 Units, TID WC  insulin lispro (HUMALOG) injection vial 0-4 Units, Nightly  Vitamin D (CHOLECALCIFEROL) tablet 2,000 Units, Daily  sodium chloride flush 0.9 % injection 5-40 mL, 2 times per day  sodium chloride flush 0.9 % injection 5-40 mL, PRN  0.9 % sodium chloride infusion, PRN  ondansetron (ZOFRAN-ODT) disintegrating tablet 4 mg, Q8H PRN   Or  ondansetron (ZOFRAN) injection 4 mg, Q6H PRN  acetaminophen (TYLENOL) tablet 650 mg, Q6H PRN   Or  acetaminophen (TYLENOL) suppository 650 mg, Q6H PRN  polyethylene glycol (GLYCOLAX) packet 17 g, Daily PRN  dextrose bolus 10% 125 mL, PRN   Or  dextrose bolus 10% 250 mL, PRN  glucagon (rDNA) injection 1 mg, PRN  dextrose 10 % infusion, Continuous PRN  aspirin chewable tablet 81 mg, Daily  metoprolol tartrate (LOPRESSOR) tablet 25 mg, BID        Review of Systems:   14 point ROS obtained but were negative except mentioned in HPI      Physical exam:     Vitals:  /71   Pulse 82   Temp 97.4 °F (36.3 °C) (Oral)   Resp 16   Ht 5' 4\" (1.626 m)   Wt 200 lb 4.8 oz (90.9 kg)   SpO2 91%   BMI 34.38 kg/m²   Constitutional:  OAA X3 NAD  Skin: no rash, turgor wnl  Heent:  eomi, mmm  Neck: no bruits or jvd noted  Cardiovascular:  S1, S2 without m/r/g  Respiratory: CTA B without w/r/r  Abdomen:  +bs, soft, nt,
Net 250 ml       Telemetry monitor: sinus rhythm with PACs    Physical Exam:  General:  Awake, alert, NAD  Psych : calm / pleasant  Skin:  Warm and dry  Chest:  Clear to auscultation, respiration easy  Cardiovascular:  RRR with ectopy 80 S1S2 no murmur to auscultation; no JVD  Abdomen: Bowel sounds normal, abd soft, non-tender  Extremities:  lea LE edema ; + tenderness, ace wrapped  : unremarkable      Imaging    Echo 3/31/2023:   Summary   -Definity was used to assist with endocardial border delineation.   -Mild concentric left ventricular hypertrophy.   -Normal left ventricular cavity size and systolic function with an estimated   ejection fraction of 55%. 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 -Grade I diastolic dysfunction with normal LV filling pressures.   -The right ventricle is normal in size with borderline systolic function by   TAPSE.   -Mild tricuspid regurgitation with an estimated PASP of 27 mmHg.    -Mildly dilated main pulmonary artery measuring    Lab Review     Renal Profile:   Lab Results   Component Value Date/Time    CREATININE 1.3 04/01/2023 05:30 AM    BUN 19 04/01/2023 05:30 AM     04/01/2023 05:30 AM    K 4.4 04/01/2023 05:30 AM     04/01/2023 05:30 AM    CO2 25 04/01/2023 05:30 AM     CBC:    Lab Results   Component Value Date/Time    WBC 8.0 04/01/2023 05:30 AM    RBC 3.24 04/01/2023 05:30 AM    HGB 9.2 04/01/2023 05:30 AM    HCT 29.3 04/01/2023 05:30 AM    MCV 90.5 04/01/2023 05:30 AM    RDW 15.1 04/01/2023 05:30 AM     04/01/2023 05:30 AM       Fasting Lipid Panel:    Lab Results   Component Value Date/Time    CHOL 101 07/31/2021 06:34 AM    HDL 43 03/22/2023 10:52 AM    HDL 35 02/11/2011 06:20 AM    TRIG 50 07/31/2021 06:34 AM     Cardiac Enzymes:    Lab Results   Component Value Date/Time    CKMB <0.2 12/03/2011 08:15 AM    TROPONINI <0.01 03/30/2023 10:35 AM     Lab Results   Component
is on [employer]: does not recognize name of medication. Timing of last doses updated. Thank you,  Anna Rogel
ml   Output --   Net 10 ml       Telemetry monitor: sinus rhythm    Physical Exam:  General:  Awake, alert, NAD  Psych : calm / pleasant  Skin:  Warm and dry  Chest:  Clear to auscultation, respiration easy  Cardiovascular:  RRR with ectopy S1S2 no murmur to auscultation; no JVD  Abdomen: Bowel sounds normal, abd soft, non-tender  Extremities:  lea LE edema ; + tenderness, ace wrapped  : unremarkable      Imaging    Echo 3/31/2023:   Summary   -Definity was used to assist with endocardial border delineation.   -Mild concentric left ventricular hypertrophy.   -Normal left ventricular cavity size and systolic function with an estimated   ejection fraction of 55%. 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 -Grade I diastolic dysfunction with normal LV filling pressures.   -The right ventricle is normal in size with borderline systolic function by   TAPSE.   -Mild tricuspid regurgitation with an estimated PASP of 27 mmHg.    -Mildly dilated main pulmonary artery measuring    Lab Review     Renal Profile:   Lab Results   Component Value Date/Time    CREATININE 1.4 04/02/2023 05:48 AM    BUN 21 04/02/2023 05:48 AM     04/02/2023 05:48 AM    K 4.3 04/02/2023 05:48 AM     04/02/2023 05:48 AM    CO2 25 04/02/2023 05:48 AM     CBC:    Lab Results   Component Value Date/Time    WBC 6.4 04/02/2023 05:48 AM    RBC 3.20 04/02/2023 05:48 AM    HGB 9.2 04/02/2023 05:48 AM    HCT 29.1 04/02/2023 05:48 AM    MCV 90.9 04/02/2023 05:48 AM    RDW 15.8 04/02/2023 05:48 AM     04/02/2023 05:48 AM       Fasting Lipid Panel:    Lab Results   Component Value Date/Time    CHOL 101 07/31/2021 06:34 AM    HDL 43 03/22/2023 10:52 AM    HDL 35 02/11/2011 06:20 AM    TRIG 50 07/31/2021 06:34 AM     Cardiac Enzymes:    Lab Results   Component Value Date/Time    CKMB <0.2 12/03/2011 08:15 AM    TROPONINI <0.01 03/30/2023 10:35 AM     Lab Results   Component
research shows that an AM-PAC score of 18 or greater is typically associated with a discharge to the patient's home setting. Based on the patient's AM-PAC score and their current functional mobility deficits, it is recommended that the patient have 2-3 sessions per week of Physical Therapy at d/c to increase the patient's independence. At this time, this patient demonstrates the endurance and safety to discharge home with home services and a follow up treatment frequency of 2-3x/wk. Please see assessment section for further patient specific details. If patient discharges prior to next session this note will serve as a discharge summary. Please see below for the latest assessment towards goals.        HOME HEALTH CARE: LEVEL 1 STANDARD    - Initial home health evaluation to occur within 24-48 hours, in patient home   - Therapy to evaluate with goal of regaining prior level of functioning   - Therapy to evaluate if patient has [id number] needs for personal care   DME Required For Discharge: rolling walker  Precautions/Restrictions: high fall risk, up with assistance, adult diet - regular, 4 carb choices  Weight Bearing Restrictions: no restrictions  Positional Restrictions:no positional restrictions    Pre-Admission Information from Initial Evaluation:  Lives With: spouse, daughter lives 0.5 mile away     Type of Home: house  Home Layout: two level, laundry in basement, bed and bathroom on main floor   Home Access: level entry  [facility] 45: walk in [address]: shower chair  Toilet Height: standard height  Home Equipment: quad cane  Transfer Assistance: Independent without use of device  Ambulation Assistance:Independent without use of device in house, uses quad cane with community ambulation   ADL Assistance: independent with all ADL's  IADL Assistance: requires assistance with cleaning, receives help once a week  Active :        [x] Yes  [] No  Hand Dominance: [] Left  [x]
score of 18 or greater is typically associated with a discharge to the patient's home setting. Based on the patient's AM-PAC score, and their current ADL deficits, it is recommended that the patient have 2-3 sessions per week of Occupational Therapy at d/c to increase the patient's independence. At this time, this patient demonstrates the endurance and safety to discharge home with home (home vs OP services) and a follow up treatment frequency of 2-3x/wk. Please see assessment section for further patient specific details. If patient discharges prior to next session this note will serve as a discharge summary. Please see below for the latest assessment towards goals.      HOME HEALTH CARE: LEVEL 1 STANDARD    - Initial home health evaluation to occur within 24-48 hours, in patient home   - Therapy to evaluate with goal of regaining prior level of functioning   - Therapy to evaluate if patient has [address] needs for personal care        DME Required For Discharge: rolling walker, bedside commode    Precautions/Restrictions: high fall risk  Weight Bearing Restrictions: no restrictions  [] Right Upper Extremity  [] Left Upper Extremity [] Right Lower Extremity  [] Left Lower Extremity     Required Braces/Orthotics: no braces required   [] Right  [] Left  Positional Restrictions:no positional restrictions    Pre-Admission Information   Lives With: spouse, daughter lives 0.5 mile away                  Type of Home: house  Home Layout: two level, laundry in basement, bed and bathroom on main floor   Home Access: level entry  Bathroom Layout: walk in shower  Bathroom Equipment: shower chair  Toilet Height: standard height  Home Equipment: quad cane  Transfer Assistance: Independent without use of device  Ambulation Assistance:Independent without use of device in house, uses quad cane with community ambulation   ADL Assistance: independent with all ADL's  IADL Assistance: requires assistance with cleaning,
the 24 hours ending 04/04/23 1116   Wt Readings from Last 3 Encounters:   03/30/23 208 lb (94.3 kg)   03/22/23 208 lb (94.3 kg)   02/21/23 200 lb (90.7 kg)     General:  Awake, alert, oriented in NAD  Skin:  Warm and dry. No unusual bruising or rash  Neck:  Supple. No JVD or carotid bruit appreciated  Chest:  Normal effort. Clear to auscultation, no wheezes/rhonchi/rales  Cardiovascular:  RRR, normal S1/S2, no murmur/gallop/rub  Abdomen:  Soft, nontender, +bowel sounds  Extremities:  No edema  Neurological: No focal deficits  Psychological: Normal mood and affect       Labs and Tests:  CBC:   Recent Labs     04/02/23  0548 04/03/23  0603 04/04/23  0508   WBC 6.4 5.7 6.2   HGB 9.2* 9.0* 9.3*    186 199     BMP:    Recent Labs     04/02/23  0548 04/03/23  0603 04/04/23  0508   * 137 137   K 4.3 4.2 4.4    101 101   CO2 25 26 26   BUN 21* 21* 24*   CREATININE 1.4* 1.4* 1.5*   GLUCOSE 190* 184* 183*     Hepatic: No results for input(s): AST, ALT, ALB, BILITOT, ALKPHOS in the last 72 hours. CXR 3/30/2023:  No acute disease         CT Head 3/30/2023:  No acute intracranial abnormality. Echo 3/31/2023:  Summary   -Definity was used to assist with endocardial border delineation.   -Mild concentric left ventricular hypertrophy.   -Normal left ventricular cavity size and systolic function with an estimated ejection fraction of 55%. 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 -Grade I diastolic dysfunction with normal LV filling pressures.   -The right ventricle is normal in size with borderline systolic function by TAPSE.   -Mild tricuspid regurgitation with an estimated PASP of 27 mmHg. -Mildly dilated main pulmonary artery measuring 3.74 cm. MPI 4/4/2023:  Summary    Normal LV size with mildly depressed systolic function. Left ventricular ejection fraction of 46%.     Medium sized, moderate intensity, fixed,
03/30/23 208 lb (94.3 kg)   03/22/23 208 lb (94.3 kg)   02/21/23 200 lb (90.7 kg)       Physical Examination:   General appearance:  No apparent distress, appears stated age and cooperative. HEENT: Normocephalic, sclera clear., PERRLA. Trachea midline, no adenopathy. Cardiovascular: Regular rate and rhythm, normal S1, S2. No murmur. Respiratory:Clear to auscultation bilaterally, no wheeze or crackles. GI: Abdomen soft, no tenderness, not distended, normal bowel sounds  Musculoskeletal: No cyanosis in digits. No BLE edema present  Neurology: CN 2-12 grossly intact. No speech or motor deficits  Psych: Not agitated, appropriate affect  Skin: Warm, dry, normal turgor    Labs and Tests:  CBC:   Recent Labs     03/31/23  0508 04/01/23  0530 04/02/23  0548   WBC 7.2 8.0 6.4   HGB 9.2* 9.2* 9.2*    194 183       BMP:    Recent Labs     03/31/23  0508 04/01/23  0530 04/02/23  0548    138 135*   K 4.6 4.4 4.3    104 100   CO2 23 25 25   BUN 14 19 21*   CREATININE 1.4* 1.3* 1.4*   GLUCOSE 211* 218* 190*       Hepatic:   Recent Labs     03/30/23  1035   AST 10*   ALT 9*   BILITOT 0.3   ALKPHOS 63       CT HEAD WO CONTRAST   Final Result   No acute intracranial abnormality. XR CHEST PORTABLE   Final Result   No acute disease         NM MYOCARDIAL SPECT REST EXERCISE OR RX    (Results Pending)       Problem List  Principal Problem:    UTI (urinary tract infection)  Active Problems:    Lightheadedness    Swelling    CAD (coronary artery disease)    S/P CABG (coronary artery bypass graft)    SOB (shortness of breath)    PAOLA (acute kidney injury) ([facility]    Stage 3b chronic kidney disease ([facility]  Resolved Problems:    * No resolved hospital problems.  Patricia Diaz MD   4/2/2023 8:58 AM
208 lb (94.3 kg)   02/21/23 200 lb (90.7 kg)       Physical Examination:   General appearance:  No apparent distress, appears stated age and cooperative. HEENT: Normocephalic, sclera clear., PERRLA. Trachea midline, no adenopathy. Cardiovascular: Regular rate and rhythm, normal S1, S2. No murmur. Respiratory:Clear to auscultation bilaterally, no wheeze or crackles. GI: Abdomen soft, no tenderness, not distended, normal bowel sounds  Musculoskeletal: No cyanosis in digits. No BLE edema present  Neurology: CN 2-12 grossly intact. No speech or motor deficits  Psych: Not agitated, appropriate affect  Skin: Warm, dry, normal turgor    Labs and Tests:  CBC:   Recent Labs     03/30/23  1035 03/31/23  0508   WBC 7.2 7.2   HGB 10.3* 9.2*    195     BMP:    Recent Labs     03/30/23  1035 03/31/23  0508    140   K 4.7 4.6    109   CO2 25 23   BUN 15 14   CREATININE 1.2 1.4*   GLUCOSE 279* 211*     Hepatic:   Recent Labs     03/30/23  1035   AST 10*   ALT 9*   BILITOT 0.3   ALKPHOS 63     CT HEAD WO CONTRAST   Final Result   No acute intracranial abnormality. XR CHEST PORTABLE   Final Result   No acute disease             Problem List  Principal Problem:    UTI (urinary tract infection)  Resolved Problems:    * No resolved hospital problems.  Avni Sheriff MD   3/31/2023 9:02 AM
[x] Yes  [] No  Hand Dominance: [] Left  [x] Right  Current Employment:  retired , worked in [employer]: fell within past year, able to get up independently, injured knee    Examination   Vision:   Vision Gross Assessment: Impaired and Vision Corrective Device: wears glasses at all times  Hearing:   left hearing aid, right hearing aid  Observation:     Posture:   Seated posture: sacral sitting, increased thoracic kyphosis. Standing posture: rounded shoulders, forward head, varus deformity   Sensation:   reports numbness and tingling in (L) UE,  ROM:   (B) LE AROM WFL  Strength:   (B) LE strength grossly WFL  Therapist Clinical Decision Making (Complexity): low complexity  Clinical Presentation: stable      Subjective  General: Pt was in semi-fowlers position upon arrival. Pt Andreafski. Pt agreeable to PT/OT evaluation. Pain: 0/10  Pain Interventions: not applicable       Functional Mobility  Bed Mobility  Supine to Sit: stand by assistance  Scooting: stand by assistance  Comments: HOB elevated, increase time to complete  Transfers  Sit to stand transfer: stand by assistance  Stand to sit transfer: stand by assistance  Comments: sit to stand from EOB, from toilet, from recliner chair. Utilizing UE for STS. Stand to sit to toilet and recliner. Demonstrates good eccentric control with stand to sit. Ambulation  Surface:level surface  Assistive Device: small based quad cane  Assistance: contact guard assistance  Distance: ~75'  Gait Mechanics: decreased alisha, decreased step length and height, notable medial/lateral instability 2 minor LOB -corrected with lateral step, lateral sway   Comments:    Stair Mobility  Stair mobility not completed on this date. Comments:  Wheelchair Mobility:  No w/c mobility completed on this date.   Comments:  Balance  Static Sitting Balance: fair (+): maintains balance at SBA/supervision without use of UE support  Dynamic Sitting Balance: fair (+): maintains balance at
mmHg.    Assessment/Plan:  Principal Problem:    UTI (urinary tract infection)  Plan: Acute. Antibiotics per primary service. Active Problems:    CAD (coronary artery disease)  Plan: S/p CABG. Does have shortness of breath. Echo today suggests apical wall motion abnormality concerning for prior infarct. Lexiscan Myoview stress test f does not suggest reversible ischemia. Fixed defect noted in the apex, similar to echo, suggesting prior infarct. S/P CABG (coronary artery bypass graft)  Plan: As above. Dizziness  Plan: Etiology unclear. Improving. Possible contribution from UTI. Swelling  Plan: There is a dependent component. Creatinine mildly elevated and may make consistent diuretic use difficult. Recommend compression hose and elevation of the feet. SOB (shortness of breath)  Plan: Likely multifactorial.     Compression hose. Elevation of the feet. Medical management of CAD. PAOLA  Plan: Likely multifactorial including diuresis. Per nephrology. Hopefully home in 24 to 48 hours.       Zuleima Mcfarlane MD, MD 4/4/2023 6:49 PM
receives help once a week  Active :        [x] Yes                 [] No  Hand Dominance: [] Left                 [x] Right  Current Employment:  retired , worked in [employer]: fell within past year, able to get up independently, injured knee    Examination   Vision:   Vision Gross Assessment: Impaired and Vision Corrective Device: wears glasses at all times  Hearing:   left hearing aid, right hearing aid  Posture:   Fair  Sensation:   reports numbness and tingling in (L) UE    Tone:   Normotonic  Coordination Testing:   Finger to Nose: WFL  Alternating Pronation/Supination: WFL  Finger/Thumb Opposition: WFL    ROM:   (B) UE AROM WFL  Strength:   (B) UE strength grossly WFL    Therapist Clinical Decision Making (Complexity): low complexity  Clinical Presentation: stable      Subjective  General: pt supine in bed, [place], agreeable to OT/PT eval  Pain: 0/10  Pain Interventions: not applicable        Activities of Daily Living  Basic Activities of Daily Living  Grooming: stand by assistance  Grooming Comments: pt seated on BSC at sink, pt performed toothbrushing and hair combing independently, pt fatigues after standing for long periods of time  Lower Extremity Dressing: stand by assistance  Dressing Comments: pt able to don pants standing at toilet  Toileting: stand by assistance. Toileting Comments: pt demo good balance, able to perform desean-care, able to get on/off toilet  Instrumental Activities of Daily Living  No IADL completed on this date. Functional Mobility  Bed Mobility  Supine to Sit: stand by assistance  Scooting: stand by assistance  Comments: HOB elevated, increased time to complete task  Transfers  Sit to stand transfer:stand by assistance  Stand to sit transfer: stand by assistance  Comments: pt completed 4x STS from EOB, toilet, BSC, and recliner, demo good eccentric control  Functional Mobility:  Sitting Balance: supervision. Standing Balance: stand by assistance.
soft, nt, nd  Ext: mild  lower extremity edema  Psychiatric: mood and affect appropriate  Musculoskeletal:  Rom, muscular strength intact    Labs:  CBC:   Recent Labs     03/31/23  0508 04/01/23  0530 04/02/23  0548   WBC 7.2 8.0 6.4   HGB 9.2* 9.2* 9.2*    194 183     BMP:    Recent Labs     03/31/23  0508 04/01/23  0530 04/02/23  0548    138 135*   K 4.6 4.4 4.3    104 100   CO2 23 25 25   BUN 14 19 21*   CREATININE 1.4* 1.3* 1.4*   GLUCOSE 211* 218* 190*     Ca/Mg/Phos:   Recent Labs     03/31/23  0508 04/01/23  0530 04/02/23  0548   CALCIUM 8.6 9.0 8.8   MG 1.60* 1.50* 2.20     Hepatic:   No results for input(s): AST, ALT, ALB, BILITOT, ALKPHOS in the last 72 hours. Troponin:   No results for input(s): TROPONINI in the last 72 hours. BNP: No results for input(s): BNP in the last 72 hours. Lipids: No results for input(s): CHOL, TRIG, HDL, LDLCALC, LABVLDL in the last 72 hours. ABGs: No results for input(s): PHART, PO2ART, CDD7UKJ in the last 72 hours. INR: No results for input(s): INR in the last 72 hours. UA:  No results for input(s): Valeriano Josesito, GLUCOSEU, BILIRUBINUR, KETUA, SPECGRAV, BLOODU, PHUR, PROTEINU, UROBILINOGEN, NITRU, LEUKOCYTESUR, Veto Flor in the last 72 hours. Urine Microscopic:   No results for input(s): LABCAST, BACTERIA, COMU, HYALCAST, WBCUA, RBCUA, EPIU in the last 72 hours. Urine Culture:   No results for input(s): LABURIN in the last 72 hours. Urine Chemistry: No results for input(s): Rutha Plough, PROTEINUR, NAUR in the last 72 hours. IMAGING:  CT HEAD WO CONTRAST   Final Result   No acute intracranial abnormality. XR CHEST PORTABLE   Final Result   No acute disease         NM MYOCARDIAL SPECT REST EXERCISE OR RX    (Results Pending)             Assessment/Plan :      1. Snehal on CKD 3 B   Baseline creat 1.0   Stop HCTZ and valsartan. Creat better      2. HTN  Controlled     3. UTI   Abx     4.  H/o CAD and

## 2023-04-05 NOTE — CARE COORDINATION
Discharge note:      CM/SW has been notified of discharge. Patient noted to have the following needs at discharge. CM/SW has coordinated the following services: [address]  [facility], [address] [place] [address]  Phone: [phone]  Fax: [phone]       Discharge Destination: Home   Transportation: Family         Comment: Tate Rock from [facility] is aware the patient will be discharging on 4/5/2023      All CM/SW needs met, will sign off.      Electronically signed by VIRIDIANA Maldonado on 4/5/2023 at 3:49 PM

## 2023-04-06 ENCOUNTER — TELEPHONE (OUTPATIENT)
Dept: CARDIOLOGY CLINIC | Age: 88
End: 2023-04-06

## 2023-04-06 NOTE — TELEPHONE ENCOUNTER
Pt daughter Fausto Lynch called stating pt was in the hospital with UTI and SOB, pt was released evening of 4/5 and today the pt was tested with Home Test they are positive with COVID. Right the symptoms more like a cold. Fausto Lynch is not sure what to do or not to do with the pt, what can she take or not take?      Pls advise thank you   Please return the call to number below   Fausto Lynch   [phone]

## 2023-04-13 ENCOUNTER — TELEPHONE (OUTPATIENT)
Dept: CARDIOLOGY CLINIC | Age: 88
End: 2023-04-13

## 2023-04-27 NOTE — TELEPHONE ENCOUNTER
Anticoagulation Summary  As of 2023    INR goal:  1.5-2.5   TTR:  76.6 % (3 y)   INR used for dosin.4 (2023)   Warfarin maintenance plan:  7.5 mg (5 mg x 1.5) every , , ; 5 mg (5 mg x 1) all other days   Weekly warfarin total:  42.5 mg   Plan last modified:  Shelly Watson RN (2023)   Next INR check:  2023   Target end date:           Anticoagulation Episode Summary     INR check location:      Preferred lab:      Send INR reminders to:  [facility] ORR ONC BUR NURSE MSG POOL    Comments:             Per [facility], no specific recommendations from cardiac standpoint.  Call PCP for consideration of paxlovid

## 2023-04-30 PROBLEM — N39.0 UTI (URINARY TRACT INFECTION): Status: RESOLVED | Noted: 2023-03-30 | Resolved: 2023-04-30

## 2023-06-06 NOTE — PATIENT INSTRUCTIONS
Elevate your legs whenever you are sitting.    Stop taking Aspirin, continue Plavix   We will discuss with partners in regards to Afib  See Dr Clint Castillo in 3 months - 8/30/23 at 1pm

## 2023-06-07 ENCOUNTER — TELEPHONE (OUTPATIENT)
Dept: CARDIOLOGY CLINIC | Age: 88
End: 2023-06-07

## 2023-06-07 ENCOUNTER — OFFICE VISIT (OUTPATIENT)
Dept: CARDIOLOGY CLINIC | Age: 88
End: 2023-06-07
Payer: MEDICARE

## 2023-06-07 VITALS
DIASTOLIC BLOOD PRESSURE: 74 MMHG | SYSTOLIC BLOOD PRESSURE: 138 MMHG | OXYGEN SATURATION: 97 % | WEIGHT: 199 LBS | HEART RATE: 58 BPM | BODY MASS INDEX: 36.62 KG/M2 | HEIGHT: 62 IN

## 2023-06-07 DIAGNOSIS — I25.10 CORONARY ARTERY DISEASE DUE TO LIPID RICH PLAQUE: Primary | Chronic | ICD-10-CM

## 2023-06-07 DIAGNOSIS — Z95.1 S/P CABG (CORONARY ARTERY BYPASS GRAFT): Chronic | ICD-10-CM

## 2023-06-07 DIAGNOSIS — I10 PRIMARY HYPERTENSION: Chronic | ICD-10-CM

## 2023-06-07 DIAGNOSIS — E78.00 PURE HYPERCHOLESTEROLEMIA: Chronic | ICD-10-CM

## 2023-06-07 DIAGNOSIS — I25.83 CORONARY ARTERY DISEASE DUE TO LIPID RICH PLAQUE: Primary | Chronic | ICD-10-CM

## 2023-06-07 PROCEDURE — 1090F PRES/ABSN URINE INCON ASSESS: CPT | Performed by: INTERNAL MEDICINE

## 2023-06-07 PROCEDURE — 1036F TOBACCO NON-USER: CPT | Performed by: INTERNAL MEDICINE

## 2023-06-07 PROCEDURE — 99215 OFFICE O/P EST HI 40 MIN: CPT | Performed by: INTERNAL MEDICINE

## 2023-06-07 PROCEDURE — G8427 DOCREV CUR MEDS BY ELIG CLIN: HCPCS | Performed by: INTERNAL MEDICINE

## 2023-06-07 PROCEDURE — 1123F ACP DISCUSS/DSCN MKR DOCD: CPT | Performed by: INTERNAL MEDICINE

## 2023-06-07 PROCEDURE — G8417 CALC BMI ABV UP PARAM F/U: HCPCS | Performed by: INTERNAL MEDICINE

## 2023-06-07 RX ORDER — CLOPIDOGREL BISULFATE 75 MG/1
75 TABLET ORAL DAILY
Qty: 90 TABLET | Refills: 3
Start: 2023-06-07

## 2023-06-07 NOTE — TELEPHONE ENCOUNTER
Please let pt daughter know that [facility] reviewed the cardiac monitor with his partner and they confirmed that there was no atrial fibrillation.  Thanks

## 2023-06-08 ENCOUNTER — OFFICE VISIT (OUTPATIENT)
Dept: ORTHOPEDIC SURGERY | Age: 88
End: 2023-06-08

## 2023-06-08 VITALS — BODY MASS INDEX: 36.62 KG/M2 | WEIGHT: 199 LBS | HEIGHT: 62 IN

## 2023-06-08 DIAGNOSIS — M17.12 PRIMARY OSTEOARTHRITIS OF LEFT KNEE: Primary | ICD-10-CM

## 2023-06-08 RX ORDER — TRIAMCINOLONE ACETONIDE 40 MG/ML
40 INJECTION, SUSPENSION INTRA-ARTICULAR; INTRAMUSCULAR ONCE
Status: COMPLETED | OUTPATIENT
Start: 2023-06-08 | End: 2023-06-08

## 2023-06-08 RX ORDER — LIDOCAINE HYDROCHLORIDE 10 MG/ML
4 INJECTION, SOLUTION EPIDURAL; INFILTRATION; INTRACAUDAL; PERINEURAL ONCE
Status: COMPLETED | OUTPATIENT
Start: 2023-06-08 | End: 2023-06-08

## 2023-06-08 RX ADMIN — TRIAMCINOLONE ACETONIDE 40 MG: 40 INJECTION, SUSPENSION INTRA-ARTICULAR; INTRAMUSCULAR at 15:52

## 2023-06-08 RX ADMIN — LIDOCAINE HYDROCHLORIDE 4 ML: 10 INJECTION, SOLUTION EPIDURAL; INFILTRATION; INTRACAUDAL; PERINEURAL at 15:52

## 2023-06-08 NOTE — PROGRESS NOTES
CHIEF COMPLAINT: Left knee pain/ osteoarthritis. HISTORY:  Ms. Thi Sears 80 y.o.  female presents today for f/u evaluation of left knee pain which started to worsen years ago but more significantly 2 months ago. Denies injury or trauma. She is complaining of achy pain. Pain is increase with standing and walking and decrease with rest.  She rates her pain a 8/10 VAS. She uses a walker. Pain is sharp early in the morning with first few steps, dull achy pain by the end of the day. Alleviating factors: rest. No radiation and no numbness and tingling sensation. No other complaint. No h/o trauma or gout. She has a history of right total knee replacement and is not wanting any further surgery at this time. Past Medical History:   Diagnosis Date    Acute MI ([address]     Arthritis     hands    CAD (coronary artery disease)     stents    Diabetes mellitus ([facility] [address]     GERD (gastroesophageal reflux disease)     History of blood transfusion     R/T previous surgeries    Hyperlipidemia     Hypertension     Nausea & vomiting        Past Surgical History:   Procedure Laterality Date    BACK SURGERY      BREAST SURGERY      CARDIAC SURGERY      STENTS X3    CARPAL TUNNEL RELEASE Right     CHOLECYSTECTOMY, LAPAROSCOPIC      CORONARY ARTERY BYPASS GRAFT  02/15/2011    EYE SURGERY      KNEE SURGERY      right    ULNAR TUNNEL RELEASE Right     repeat with better relief/nerve moved       Social History     Socioeconomic History    Marital status:       Spouse name: Not on file    Number of children: 5    Years of education: 9    Highest education level: Not on file   Occupational History    Not on file   Tobacco Use    Smoking status: Former     Packs/day: 1.00     Years: 15.00     Pack years: 15.00     Types: Cigarettes     Quit date: 1993     Years since quittin.4    Smokeless tobacco: Never    Tobacco comments:     quit 17 years ago   Vaping Use    Vaping Use: Never used   Substance and Sexual Activity

## 2023-07-24 RX ORDER — CHOLECALCIFEROL (VITAMIN D3) 50 MCG
2000 TABLET ORAL DAILY
Qty: 30 TABLET | Refills: 1 | OUTPATIENT
Start: 2023-07-24

## 2023-08-29 NOTE — PROGRESS NOTES
[facility] [place]   Cardiac Followup    Referring Provider:  [facility]     Chief Complaint   Patient presents with    Coronary Artery Disease    Hypertension    Hyperlipidemia         History of Present Illness:   Mrs. Geri Haines is an 80y.o.  year old female who presents today for follow up on CAD, CABG '8, Htn, [facility]. February '23 she reported she fell at home and hurt her knee, however, does not want to have a replacement. She lives alone and is not very active. She has been having swelling that worsens throughout the day, better in the morning when she wakes up.     4/2023 - hospitalization for dizziness related to UTI/hypotension  4/2023 - hospitalization with rectal bleeding at [facility]. S/p colonoscopy showed diverticulitis. She received 3 units of blood. Today she is here with her daughter. She reports her breathing is much better from her hospital stay in April. She denies any significant cardiac symptoms. She uses a cane d/t a bad knee. Her daughter does note that she gets out of breath some, more so when her knee is bothering her. She has been getting steroid injections to help. Past Medical History:   has a past medical history of Acute MI ([address]), Arthritis, CAD (coronary artery disease), Diabetes mellitus ([address]), GERD (gastroesophageal reflux disease), History of blood transfusion, Hyperlipidemia, Hypertension, and Nausea & vomiting. Surgical History:   has a past surgical history that includes Cardiac surgery; knee surgery; back surgery; Cholecystectomy, laparoscopic; eye surgery; Breast surgery; Coronary artery bypass graft (02/15/2011); Carpal tunnel release (Right); and Ulnar tunnel release (Right). Social History:   reports that she quit smoking about 30 years ago. Her smoking use included cigarettes. She has a 15.00 pack-year smoking history. She has never used smokeless tobacco. She reports that she does not drink alcohol and does not use drugs.      Family History:  family

## 2023-08-30 ENCOUNTER — OFFICE VISIT (OUTPATIENT)
Dept: CARDIOLOGY CLINIC | Age: 88
End: 2023-08-30
Payer: MEDICARE

## 2023-08-30 VITALS
BODY MASS INDEX: 36.71 KG/M2 | DIASTOLIC BLOOD PRESSURE: 60 MMHG | OXYGEN SATURATION: 97 % | HEIGHT: 60 IN | HEART RATE: 91 BPM | SYSTOLIC BLOOD PRESSURE: 90 MMHG | WEIGHT: 187 LBS

## 2023-08-30 DIAGNOSIS — Z95.1 S/P CABG (CORONARY ARTERY BYPASS GRAFT): Chronic | ICD-10-CM

## 2023-08-30 DIAGNOSIS — R06.02 SOB (SHORTNESS OF BREATH): ICD-10-CM

## 2023-08-30 DIAGNOSIS — I10 PRIMARY HYPERTENSION: Chronic | ICD-10-CM

## 2023-08-30 DIAGNOSIS — E78.00 PURE HYPERCHOLESTEROLEMIA: Chronic | ICD-10-CM

## 2023-08-30 DIAGNOSIS — I25.83 CORONARY ARTERY DISEASE DUE TO LIPID RICH PLAQUE: Primary | Chronic | ICD-10-CM

## 2023-08-30 DIAGNOSIS — R60.9 SWELLING: ICD-10-CM

## 2023-08-30 DIAGNOSIS — I25.10 CORONARY ARTERY DISEASE DUE TO LIPID RICH PLAQUE: Primary | Chronic | ICD-10-CM

## 2023-08-30 PROCEDURE — 1123F ACP DISCUSS/DSCN MKR DOCD: CPT | Performed by: INTERNAL MEDICINE

## 2023-08-30 PROCEDURE — G8427 DOCREV CUR MEDS BY ELIG CLIN: HCPCS | Performed by: INTERNAL MEDICINE

## 2023-08-30 PROCEDURE — 99214 OFFICE O/P EST MOD 30 MIN: CPT | Performed by: INTERNAL MEDICINE

## 2023-08-30 PROCEDURE — 1090F PRES/ABSN URINE INCON ASSESS: CPT | Performed by: INTERNAL MEDICINE

## 2023-08-30 PROCEDURE — 1036F TOBACCO NON-USER: CPT | Performed by: INTERNAL MEDICINE

## 2023-08-30 PROCEDURE — G8417 CALC BMI ABV UP PARAM F/U: HCPCS | Performed by: INTERNAL MEDICINE

## 2023-10-03 ENCOUNTER — OFFICE VISIT (OUTPATIENT)
Dept: ORTHOPEDIC SURGERY | Age: 88
End: 2023-10-03
Payer: MEDICARE

## 2023-10-03 VITALS — HEIGHT: 60 IN | BODY MASS INDEX: 36.71 KG/M2 | WEIGHT: 187 LBS

## 2023-10-03 DIAGNOSIS — M17.12 PRIMARY OSTEOARTHRITIS OF LEFT KNEE: Primary | ICD-10-CM

## 2023-10-03 PROCEDURE — 20610 DRAIN/INJ JOINT/BURSA W/O US: CPT | Performed by: ORTHOPAEDIC SURGERY

## 2023-10-03 PROCEDURE — 99999 PR OFFICE/OUTPT VISIT,PROCEDURE ONLY: CPT | Performed by: ORTHOPAEDIC SURGERY

## 2023-10-03 RX ORDER — LIDOCAINE HYDROCHLORIDE 10 MG/ML
4 INJECTION, SOLUTION INFILTRATION; PERINEURAL ONCE
Status: COMPLETED | OUTPATIENT
Start: 2023-10-03 | End: 2023-10-03

## 2023-10-03 RX ORDER — TRIAMCINOLONE ACETONIDE 40 MG/ML
40 INJECTION, SUSPENSION INTRA-ARTICULAR; INTRAMUSCULAR ONCE
Status: COMPLETED | OUTPATIENT
Start: 2023-10-03 | End: 2023-10-03

## 2023-10-03 RX ADMIN — LIDOCAINE HYDROCHLORIDE 4 ML: 10 INJECTION, SOLUTION INFILTRATION; PERINEURAL at 14:50

## 2023-10-03 RX ADMIN — TRIAMCINOLONE ACETONIDE 40 MG: 40 INJECTION, SUSPENSION INTRA-ARTICULAR; INTRAMUSCULAR at 14:51

## 2023-10-03 NOTE — PROGRESS NOTES
CHIEF COMPLAINT: Left knee pain/ osteoarthritis. HISTORY:  Ms. Timo Ramirez 80 y.o.  female presents today for f/u evaluation of persistent left knee pain. She had a left knee cortisone injection on 6/8/2023 and had 100% improvement for 2 months and then her pain started gradually returning. She would like to repeat the injection. Denies injury or trauma. She is complaining of achy pain. Pain is increase with standing and walking and decrease with rest.  She rates her pain a 10/10 VAS. She uses a cane. Pain is sharp early in the morning with first few steps, dull achy pain by the end of the day. Alleviating factors: rest. No radiation and no numbness and tingling sensation. No other complaint. No h/o trauma or gout. She has a history of right total knee replacement and is not wanting any further surgery at this time. Past Medical History:   Diagnosis Date    Acute MI ([address])     Arthritis     hands    CAD (coronary artery disease)     stents    Diabetes mellitus ([address])     GERD (gastroesophageal reflux disease)     History of blood transfusion     R/T previous surgeries    Hyperlipidemia     Hypertension     Nausea & vomiting        Past Surgical History:   Procedure Laterality Date    BACK SURGERY      BREAST SURGERY      CARDIAC SURGERY      STENTS X3    CARPAL TUNNEL RELEASE Right     CHOLECYSTECTOMY, LAPAROSCOPIC      CORONARY ARTERY BYPASS GRAFT  02/15/2011    EYE SURGERY      KNEE SURGERY      right    ULNAR TUNNEL RELEASE Right     repeat with better relief/nerve moved       Social History     Socioeconomic History    Marital status:       Spouse name: Not on file    Number of children: 5    Years of education: 9    Highest education level: Not on file   Occupational History    Not on file   Tobacco Use    Smoking status: Former     Packs/day: 1.00     Years: 15.00     Additional pack years: 0.00     Total pack years: 15.00     Types: Cigarettes     Quit date: 1/1/1993     Years since

## 2024-02-20 NOTE — PROGRESS NOTES
anteroapical fixed defect consistent with infarction  in the territory of the distal LAD .  -There is no evidence of stress induced ischemia.  -Normal LV function with ejection fraction of 64 %.     Echo 10/13/14:  Normal left ventricle size and systolic function with EF 55%.  [facility].  Mitral annular calcification is present.  Trivial mitral regurgitation is present.  There is mild tricuspid regurgitation with RVSP estimated at 27 mmHg.    Assessment:   CAD  CABG 2011  Stress 2016 fixed defect, no reversible ischemia  MPI 4/4/23 no evidence of ischemia, EF 46%  - Plavix, bb, statin    Plan: pt and daugter prefer to stop Asa and keep Plavix for now to see if she tolerates. May need to switch if there is a need for DOAC for Afib.     Hypertension  /60 (Site: Right Upper Arm, Position: Sitting, Cuff Size: Medium Adult)   Pulse 50   Ht 1.626 m (5' 4\")   Wt 90.7 kg (200 lb)   SpO2 95%   BMI 34.33 kg/m²   - metoprolol  - re check 114/60    Plan: stable     Hyperlipidemia  7/31/21  TG 50 HDL 41 LDL 50. LFT normal.    3/22/23  TG 72 HDL 43 LDL 49. LFT normal.    - Crestor 10 mg     Plan: Stable. Continue current medication regimen.      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 Grade I dd    Plan: Stable. Continue current medication regimen.      GI bleed  Admitted to [facility]   Received 3 units of blood  Colonoscopy showed diverticulitis     Atrial fibrillation  Question of afib while admitted to [facility] completed -reviewed with EP service, no atrial fibrillation noted    Plan:  Repeat fasting labs  Follow up in 6 months     Thank you for allowing me to participate in the care of this individual.    Gold Gonzalez M.D., [facility]    Scribe's Attestation: This note was scribed in the presence of Dr. Gold Gonzalez MD by Edwina Ybarra RN.     Physician Attestation: The scribe's documentation has been prepared under my

## 2024-02-23 ENCOUNTER — OFFICE VISIT (OUTPATIENT)
Dept: CARDIOLOGY CLINIC | Age: 89
End: 2024-02-23

## 2024-02-23 VITALS
HEIGHT: 64 IN | BODY MASS INDEX: 34.15 KG/M2 | DIASTOLIC BLOOD PRESSURE: 60 MMHG | HEART RATE: 50 BPM | SYSTOLIC BLOOD PRESSURE: 112 MMHG | OXYGEN SATURATION: 95 % | WEIGHT: 200 LBS

## 2024-02-23 DIAGNOSIS — I25.10 CORONARY ARTERY DISEASE DUE TO LIPID RICH PLAQUE: Primary | Chronic | ICD-10-CM

## 2024-02-23 DIAGNOSIS — I10 PRIMARY HYPERTENSION: Chronic | ICD-10-CM

## 2024-02-23 DIAGNOSIS — I25.83 CORONARY ARTERY DISEASE DUE TO LIPID RICH PLAQUE: Primary | Chronic | ICD-10-CM

## 2024-02-23 DIAGNOSIS — E78.00 PURE HYPERCHOLESTEROLEMIA: Chronic | ICD-10-CM

## 2024-02-23 DIAGNOSIS — Z95.1 S/P CABG (CORONARY ARTERY BYPASS GRAFT): Chronic | ICD-10-CM

## 2024-02-23 RX ORDER — MEMANTINE HYDROCHLORIDE 5 MG/1
5 TABLET ORAL 2 TIMES DAILY
COMMUNITY

## 2024-02-23 RX ORDER — ESCITALOPRAM OXALATE 5 MG/1
5 TABLET ORAL DAILY
COMMUNITY

## 2025-04-01 ENCOUNTER — TELEPHONE (OUTPATIENT)
Age: 89
End: 2025-04-01

## 2025-04-01 NOTE — TELEPHONE ENCOUNTER
Currently, there are no openings in [place] with [facility] before July. If patient would be Okay seeing a nurse practitioner this time she can be seen in [place] and then I can look for a spot later in the year for [facility] in .

## 2025-04-01 NOTE — TELEPHONE ENCOUNTER
Called patients daughter regarding appointment on 4/15, she is requesting an appointment at  if possible as [place] is too far to drive. Please advise, thanks

## 2025-04-15 ENCOUNTER — OFFICE VISIT (OUTPATIENT)
Dept: CARDIOLOGY CLINIC | Age: 89
End: 2025-04-15

## 2025-04-15 VITALS
HEART RATE: 56 BPM | OXYGEN SATURATION: 96 % | DIASTOLIC BLOOD PRESSURE: 60 MMHG | BODY MASS INDEX: 33.46 KG/M2 | WEIGHT: 196 LBS | SYSTOLIC BLOOD PRESSURE: 132 MMHG | HEIGHT: 64 IN

## 2025-04-15 DIAGNOSIS — I10 PRIMARY HYPERTENSION: ICD-10-CM

## 2025-04-15 DIAGNOSIS — E78.2 MIXED HYPERLIPIDEMIA: Chronic | ICD-10-CM

## 2025-04-15 DIAGNOSIS — I25.10 CORONARY ARTERY DISEASE DUE TO LIPID RICH PLAQUE: Primary | ICD-10-CM

## 2025-04-15 DIAGNOSIS — I25.83 CORONARY ARTERY DISEASE DUE TO LIPID RICH PLAQUE: Primary | ICD-10-CM

## 2025-04-15 RX ORDER — EMPAGLIFLOZIN 25 MG/1
25 TABLET, FILM COATED ORAL EVERY MORNING
COMMUNITY

## 2025-04-15 NOTE — PROGRESS NOTES
[facility]     Outpatient Follow Up Note    Raquel Rowland is 89 y.o. female who presents today with a history of CAD s/p CABG '11, HTN and hyperlipidemia.    Her other hx: BRBPR '23 d/t diverticulitis     CHIEF COMPLAINT / HPI:  Follow Up secondary to coronary artery disease    Subjective:   She denies significant chest pain. There is no SOB/JOSEPH. The patient denies orthopnea/PND. The patient does not have swelling but they look the best early in the morning. The patients weight is fairly stable . The patient is not experiencing palpitations or dizziness.   She likes making green beans with diaz and served with corn bread.    These symptoms show no change since the last OV.   With regard to medication therapy the patient has been compliant with prescribed regimen. They have tolerated therapy to date.     Past Medical History:   Diagnosis Date    Acute MI (HCC)     Arthritis     hands    CAD (coronary artery disease)     stents    Diabetes mellitus (HCC)     GERD (gastroesophageal reflux disease)     History of blood transfusion     R/T previous surgeries    Hyperlipidemia     Hypertension     Nausea & vomiting      Social History:    Social History     Tobacco Use   Smoking Status Former    Current packs/day: 0.00    Average packs/day: 1 pack/day for 15.0 years (15.0 ttl pk-yrs)    Types: Cigarettes    Start date: 1978    Quit date: 1993    Years since quittin.3   Smokeless Tobacco Never   Tobacco Comments    quit 17 years ago     Current Medications:  Current Outpatient Medications   Medication Sig Dispense Refill    Tirzepatide 2.5 MG/0.5ML SOAJ Inject 2.5 mg into the skin every 7 days      JARDIANCE 25 MG tablet Take 1 tablet by mouth every morning      Magnesium Oxide -Mg Supplement 400 MG CAPS Take 800 mg by mouth 2 times daily      escitalopram (LEXAPRO) 5 MG tablet Take 1 tablet by mouth daily      memantine (NAMENDA) 5 MG tablet Take 1 tablet by mouth 2 times daily      clopidogrel

## 2025-05-08 ENCOUNTER — OFFICE VISIT (OUTPATIENT)
Dept: ORTHOPEDIC SURGERY | Age: 89
End: 2025-05-08
Payer: MEDICARE

## 2025-05-08 VITALS — HEIGHT: 64 IN | WEIGHT: 195.99 LBS | BODY MASS INDEX: 33.46 KG/M2

## 2025-05-08 DIAGNOSIS — M75.81 RIGHT ROTATOR CUFF TENDONITIS: Primary | ICD-10-CM

## 2025-05-08 PROCEDURE — 1036F TOBACCO NON-USER: CPT | Performed by: ORTHOPAEDIC SURGERY

## 2025-05-08 PROCEDURE — 1090F PRES/ABSN URINE INCON ASSESS: CPT | Performed by: ORTHOPAEDIC SURGERY

## 2025-05-08 PROCEDURE — 99204 OFFICE O/P NEW MOD 45 MIN: CPT | Performed by: ORTHOPAEDIC SURGERY

## 2025-05-08 PROCEDURE — 1125F AMNT PAIN NOTED PAIN PRSNT: CPT | Performed by: ORTHOPAEDIC SURGERY

## 2025-05-08 PROCEDURE — 1159F MED LIST DOCD IN RCRD: CPT | Performed by: ORTHOPAEDIC SURGERY

## 2025-05-08 PROCEDURE — G8427 DOCREV CUR MEDS BY ELIG CLIN: HCPCS | Performed by: ORTHOPAEDIC SURGERY

## 2025-05-08 PROCEDURE — 20610 DRAIN/INJ JOINT/BURSA W/O US: CPT | Performed by: ORTHOPAEDIC SURGERY

## 2025-05-08 PROCEDURE — 1123F ACP DISCUSS/DSCN MKR DOCD: CPT | Performed by: ORTHOPAEDIC SURGERY

## 2025-05-08 PROCEDURE — G8417 CALC BMI ABV UP PARAM F/U: HCPCS | Performed by: ORTHOPAEDIC SURGERY

## 2025-05-08 RX ORDER — TRIAMCINOLONE ACETONIDE 40 MG/ML
40 INJECTION, SUSPENSION INTRA-ARTICULAR; INTRAMUSCULAR ONCE
Status: COMPLETED | OUTPATIENT
Start: 2025-05-08 | End: 2025-05-08

## 2025-05-08 RX ORDER — LIDOCAINE HYDROCHLORIDE 10 MG/ML
4 INJECTION, SOLUTION INFILTRATION; PERINEURAL ONCE
Status: COMPLETED | OUTPATIENT
Start: 2025-05-08 | End: 2025-05-08

## 2025-05-08 RX ADMIN — LIDOCAINE HYDROCHLORIDE 4 ML: 10 INJECTION, SOLUTION INFILTRATION; PERINEURAL at 14:56

## 2025-05-08 RX ADMIN — TRIAMCINOLONE ACETONIDE 40 MG: 40 INJECTION, SUSPENSION INTRA-ARTICULAR; INTRAMUSCULAR at 14:55

## 2025-05-08 NOTE — PROGRESS NOTES
CHIEF COMPLAINT: Right shoulder pain/ cuff tendinopathy/ impingement syndrome.    HISTORY:  Ms. Rowland 89 y.o. female right handed presents today for the first visit for evaluation of right shoulder pain which started 2025 after she was pulling weeds in her yard.  She is complaining of sharp pain, 8/10.  Pain is increase with elevation and decrease with rest. No radiation and no numbness and tingling sensation. No other complaint.  No h/o gout.    Past Medical History:   Diagnosis Date    Acute MI (HCC)     Arthritis     hands    CAD (coronary artery disease)     stents    Diabetes mellitus (HCC)     GERD (gastroesophageal reflux disease)     History of blood transfusion     R/T previous surgeries    Hyperlipidemia     Hypertension     Nausea & vomiting        Past Surgical History:   Procedure Laterality Date    BACK SURGERY      BREAST SURGERY      CARDIAC SURGERY      STENTS X3    CARPAL TUNNEL RELEASE Right     CHOLECYSTECTOMY, LAPAROSCOPIC      CORONARY ARTERY BYPASS GRAFT  02/15/2011    EYE SURGERY      KNEE SURGERY      right    ULNAR TUNNEL RELEASE Right     repeat with better relief/nerve moved       Social History     Socioeconomic History    Marital status:      Spouse name: Not on file    Number of children: 5    Years of education: 9    Highest education level: Not on file   Occupational History    Not on file   Tobacco Use    Smoking status: Former     Current packs/day: 0.00     Average packs/day: 1 pack/day for 15.0 years (15.0 ttl pk-yrs)     Types: Cigarettes     Start date: 1978     Quit date: 1993     Years since quittin.3    Smokeless tobacco: Never    Tobacco comments:     quit 17 years ago   Vaping Use    Vaping status: Never Used   Substance and Sexual Activity    Alcohol use: No    Drug use: No    Sexual activity: Not Currently   Other Topics Concern    Not on file   Social History Narrative    Not on file     Social Drivers of Health     Financial Resource Strain: